# Patient Record
Sex: FEMALE | Race: WHITE | URBAN - METROPOLITAN AREA
[De-identification: names, ages, dates, MRNs, and addresses within clinical notes are randomized per-mention and may not be internally consistent; named-entity substitution may affect disease eponyms.]

---

## 2018-04-17 ENCOUNTER — OFFICE VISIT (OUTPATIENT)
Dept: OBGYN | Facility: CLINIC | Age: 38
End: 2018-04-17
Payer: COMMERCIAL

## 2018-04-17 VITALS
DIASTOLIC BLOOD PRESSURE: 58 MMHG | HEIGHT: 64 IN | SYSTOLIC BLOOD PRESSURE: 100 MMHG | WEIGHT: 127 LBS | HEART RATE: 60 BPM | BODY MASS INDEX: 21.68 KG/M2

## 2018-04-17 DIAGNOSIS — Z12.4 CERVICAL CANCER SCREENING: ICD-10-CM

## 2018-04-17 DIAGNOSIS — Z01.419 ENCOUNTER FOR GYNECOLOGICAL EXAMINATION WITHOUT ABNORMAL FINDING: Primary | ICD-10-CM

## 2018-04-17 PROCEDURE — 87624 HPV HI-RISK TYP POOLED RSLT: CPT | Performed by: OBSTETRICS & GYNECOLOGY

## 2018-04-17 PROCEDURE — 99395 PREV VISIT EST AGE 18-39: CPT | Performed by: OBSTETRICS & GYNECOLOGY

## 2018-04-17 PROCEDURE — G0145 SCR C/V CYTO,THINLAYER,RESCR: HCPCS | Performed by: OBSTETRICS & GYNECOLOGY

## 2018-04-17 ASSESSMENT — ANXIETY QUESTIONNAIRES
3. WORRYING TOO MUCH ABOUT DIFFERENT THINGS: NOT AT ALL
1. FEELING NERVOUS, ANXIOUS, OR ON EDGE: NOT AT ALL
2. NOT BEING ABLE TO STOP OR CONTROL WORRYING: NOT AT ALL
IF YOU CHECKED OFF ANY PROBLEMS ON THIS QUESTIONNAIRE, HOW DIFFICULT HAVE THESE PROBLEMS MADE IT FOR YOU TO DO YOUR WORK, TAKE CARE OF THINGS AT HOME, OR GET ALONG WITH OTHER PEOPLE: NOT DIFFICULT AT ALL
6. BECOMING EASILY ANNOYED OR IRRITABLE: NOT AT ALL
7. FEELING AFRAID AS IF SOMETHING AWFUL MIGHT HAPPEN: NOT AT ALL
5. BEING SO RESTLESS THAT IT IS HARD TO SIT STILL: NOT AT ALL
GAD7 TOTAL SCORE: 0

## 2018-04-17 ASSESSMENT — PATIENT HEALTH QUESTIONNAIRE - PHQ9: 5. POOR APPETITE OR OVEREATING: NOT AT ALL

## 2018-04-17 NOTE — LETTER
April 24, 2018    Iman Caal  [address]  [facility] [address]    Dear Iman,  We are happy to inform you that your PAP smear result from 4/17/18 is normal.  We are now able to do a follow up test on PAP smears. The DNA test is for HPV (Human Papilloma Virus). Cervical cancer is closely linked with certain types of HPV. Your results showed no evidence of high risk HPV.  Therefore we recommend you return in 5 years for your next pap smear and HPV test.  You will still need to return to the clinic every year for an annual exam and other preventive tests.  Please contact the clinic at [phone] with any questions.  Sincerely,    Aissatou Thompson Masters, DO/rlm

## 2018-04-17 NOTE — PROGRESS NOTES
Iman is a 37 year old  female who presents for annual exam.     Besides routine health maintenance,  she would like to discuss trying to get pregnant, stopped OCP's 6 months, using natural family planning now.    HPI:  The patient does not have PCP .      Came off OCPs about 6mo ago. Is wanting to conceive.  Periods were irregular initially, but now are regular each month, q 28-30d last 5d. Had been on OCPs since age 18. Had started for contraception.   No vitamins yet.     Runs marathon in 18d in [place]. Is a runner, not typically long distances.    GYNECOLOGIC HISTORY:    Patient's last menstrual period was 2018.  Her current contraception method is: natural family planning.  She  reports that she has never smoked. She has never used smokeless tobacco.    Patient is sexually active.  STD testing offered?  Declined  Last PHQ-9 score on record =   PHQ-9 SCORE 2018   Total Score 1     Last GAD7 score on record =   PATTIE-7 SCORE 2018   Total Score 0     Alcohol Score = 3    HEALTH MAINTENANCE:  Cholesterol: (No results found for: CHOL   Last Mammo: NA, Result: not applicable, Next Mammo: 3 years   Pap: 2013 WNL HPV-  Colonoscopy:  NA, Result: not applicable, Next Colonoscopy: due at 50 years.  Dexa:  NA    Health maintenance updated:  yes    HISTORY:  Obstetric History       T0      L0     SAB0   TAB0   Ectopic0   Multiple0   Live Births0           Patient Active Problem List   Diagnosis     Sprain of neck     Past Surgical History:   Procedure Laterality Date     NO HISTORY OF SURGERY        Social History   Substance Use Topics     Smoking status: Never Smoker     Smokeless tobacco: Never Used     Alcohol use 0.0 oz/week     0 Standard drinks or equivalent per week      Comment: occasional very rare      Problem (# of Occurrences) Relation (Name,Age of Onset)    Family History Negative (1) Unspecified            Current Outpatient Prescriptions   Medication Sig      "Ascorbic Acid (VITAMIN C PO)      Calcium Citrate-Vitamin D (CALCIUM + D PO)      No current facility-administered medications for this visit.      No Known Allergies    Past medical, surgical, social and family histories were reviewed and updated in EPIC.    ROS:   12 point review of systems negative other than symptoms noted below.    EXAM:  /58  Pulse 60  Ht 5' 4\" (1.626 m)  Wt 127 lb (57.6 kg)  LMP 03/31/2018  Breastfeeding? No  BMI 21.8 kg/m2   BMI: Body mass index is 21.8 kg/(m^2).    PHYSICAL EXAM:  Constitutional:  Appearance: Well nourished, well developed, alert, in no acute distress  Neck:  Lymph Nodes:  No lymphadenopathy present    Thyroid:  Gland size normal, nontender, no nodules or masses present  on palpation  Chest:  Respiratory Effort:  Breathing unlabored  Cardiovascular:    Heart: Auscultation:  Regular rate, normal rhythm, no murmurs present  Breasts: Inspection of Breasts:  No lymphadenopathy present., Palpation of Breasts and Axillae:  No masses present on palpation, no breast tenderness., Axillary Lymph Nodes:  No lymphadenopathy present. and No nodularity, asymmetry or nipple discharge bilaterally.  Gastrointestinal:   Abdominal Examination:  Abdomen nontender to palpation, tone normal without rigidity or guarding, no masses present, umbilicus without lesions   Liver and Spleen:  No hepatomegaly present, liver nontender to palpation    Hernias:  No hernias present  Lymphatic: Lymph Nodes:  No other lymphadenopathy present  Skin:  General Inspection:  No rashes present, no lesions present, no areas of  discoloration    Genitalia and Groin:  No rashes present, no lesions present, no areas of  discoloration, no masses present  Neurologic/Psychiatric:    Mental Status:  Oriented X3     Pelvic Exam:  External Genitalia:     Normal appearance for age, no discharge present, no tenderness present, no inflammatory lesions present, color normal  Vagina:     Normal vaginal vault without " central or paravaginal defects, no discharge present, no inflammatory lesions present, no masses present  Bladder:     Nontender to palpation  Urethra:   Urethral Body:  Urethra palpation normal, urethra structural support normal   Urethral Meatus:  No erythema or lesions present  Cervix:     Appearance healthy, no lesions present, nontender to palpation, no bleeding present  Uterus:     Uterus: firm, normal sized and nontender, anteverted in position.   Adnexa:     No adnexal tenderness present, no adnexal masses present  Perineum:     Perineum within normal limits, no evidence of trauma, no rashes or skin lesions present  Anus:     Anus within normal limits, no hemorrhoids present  Inguinal Lymph Nodes:     No lymphadenopathy present  Pubic Hair:     Normal pubic hair distribution for age  Genitalia and Groin:     No rashes present, no lesions present, no areas of discoloration, no masses present      COUNSELING:   Reviewed preventive health counseling, as reflected in patient instructions       Folic Acid Counseling    BMI: Body mass index is 21.8 kg/(m^2).      ASSESSMENT:  37 year old female with satisfactory annual exam.    ICD-10-CM    1. Encounter for gynecological examination without abnormal finding Z01.419 Pap imaged thin layer screen with HPV - recommended age 30 - 65     HPV High Risk Types DNA Cervical       PLAN:  -Pap/hpv obtained for cervical cancer screening. Reviewed guidelines-pap q 3yrs until age 30 when co-testing q 5 years.  -Breast self awareness discussed. Age 40 for mammogram.  -Osteoporosis prevention discussed.  -Family planning reviewed. Start PNV. Discussed decreasing distance running/intensity. Reviewed age related fertility decline. Return in 6mo if no conception.  -Return one year for next annual exam      Aissatou Pereas, DO

## 2018-04-17 NOTE — MR AVS SNAPSHOT
"              After Visit Summary   2018    Iman Caal    MRN: [id number]           Patient Information     Date Of Birth          1980        Visit Information        Provider Department      2018 1:40 PM Aissatou Soria,  [facility]        Today's Diagnoses     Encounter for gynecological examination without abnormal finding    -  1    Cervical cancer screening           Follow-ups after your visit        Follow-up notes from your care team     Return in about 1 year (around 2019).      Who to contact     If you have questions or need follow up information about today's clinic visit or your schedule please contact [facility] directly at [phone].  Normal or non-critical lab and imaging results will be communicated to you by MyChart, letter or phone within 4 business days after the clinic has received the results. If you do not hear from us within 7 days, please contact the clinic through MyChart or phone. If you have a critical or abnormal lab result, we will notify you by phone as soon as possible.  Submit refill requests through Jelly HQ or call your pharmacy and they will forward the refill request to us. Please allow 3 business days for your refill to be completed.          Additional Information About Your Visit        MyChart Information     Jelly HQ lets you send messages to your doctor, view your test results, renew your prescriptions, schedule appointments and more. To sign up, go to [email or web address] . Click on \"Log in\" on the left side of the screen, which will take you to the Welcome page. Then click on \"Sign up Now\" on the right side of the page.     You will be asked to enter the access code listed below, as well as some personal information. Please follow the directions to create your username and password.     Your access code is: [email or web address]  Expires: 2018  2:36 PM     Your access code will  in 90 " "days. If you need help or a new code, please call your [place] clinic or [phone].        Care EveryWhere ID     This is your Care EveryWhere ID. This could be used by other organizations to access your [place] medical records  [id number]        Your Vitals Were     Pulse Height Last Period Breastfeeding? BMI (Body Mass Index)       60 5' 4\" (1.626 m) 03/31/2018 No 21.8 kg/m2        Blood Pressure from Last 3 Encounters:   04/17/18 100/58   10/11/16 122/72   10/05/15 90/60    Weight from Last 3 Encounters:   04/17/18 127 lb (57.6 kg)   10/11/16 144 lb (65.3 kg)   10/05/15 135 lb (61.2 kg)              We Performed the Following     HPV High Risk Types DNA Cervical     Pap imaged thin layer screen with HPV - recommended age 30 - 65        Primary Care Provider    None Specified       No primary provider on file.        Equal Access to Services     ROOPA LOUISE : Hadcookie Jenkins, ila frank, yu oliva, tessie meyers . So [facility] [phone].    ATENCIÓN: Si habla español, tiene a morales disposición servicios gratuitos de asistencia lingüística. Llame al [phone].    We comply with applicable federal civil rights laws and [place] laws. We do not discriminate on the basis of race, color, national origin, age, disability, sex, sexual orientation, or gender identity.            Thank you!     Thank you for choosing [facility]  for your care. Our goal is always to provide you with excellent care. Hearing back from our patients is one way we can continue to improve our services. Please take a few minutes to complete the written survey that you may receive in the mail after your visit with us. Thank you!             Your Updated Medication List - Protect others around you: Learn how to safely use, store and throw away your medicines at www.disposemymeds.org.          This list is accurate as of 4/17/18  2:36 PM.  Always use your most " recent med list.                   Brand Name Dispense Instructions for use Diagnosis    CALCIUM + D PO           VITAMIN C PO       Encounter for gynecological examination without abnormal finding, Cervical cancer screening

## 2018-04-18 ASSESSMENT — PATIENT HEALTH QUESTIONNAIRE - PHQ9: SUM OF ALL RESPONSES TO PHQ QUESTIONS 1-9: 1

## 2018-04-18 ASSESSMENT — ANXIETY QUESTIONNAIRES: GAD7 TOTAL SCORE: 0

## 2018-04-19 LAB
COPATH REPORT: NORMAL
PAP: NORMAL

## 2018-04-23 LAB
FINAL DIAGNOSIS: NORMAL
HPV HR 12 DNA CVX QL NAA+PROBE: NEGATIVE
HPV16 DNA SPEC QL NAA+PROBE: NEGATIVE
HPV18 DNA SPEC QL NAA+PROBE: NEGATIVE
SPECIMEN DESCRIPTION: NORMAL
SPECIMEN SOURCE CVX/VAG CYTO: NORMAL

## 2018-12-03 ENCOUNTER — OFFICE VISIT (OUTPATIENT)
Dept: OBGYN | Facility: CLINIC | Age: 38
End: 2018-12-03
Payer: COMMERCIAL

## 2018-12-03 ENCOUNTER — TELEPHONE (OUTPATIENT)
Dept: OBGYN | Facility: CLINIC | Age: 38
End: 2018-12-03

## 2018-12-03 VITALS — SYSTOLIC BLOOD PRESSURE: 110 MMHG | WEIGHT: 129 LBS | BODY MASS INDEX: 22.14 KG/M2 | DIASTOLIC BLOOD PRESSURE: 68 MMHG

## 2018-12-03 DIAGNOSIS — Z30.09 FAMILY PLANNING: Primary | ICD-10-CM

## 2018-12-03 DIAGNOSIS — Z31.41 FERTILITY TESTING: ICD-10-CM

## 2018-12-03 PROCEDURE — 99213 OFFICE O/P EST LOW 20 MIN: CPT | Performed by: OBSTETRICS & GYNECOLOGY

## 2018-12-03 RX ORDER — PRENATAL VIT/IRON FUM/FOLIC AC 27MG-0.8MG
1 TABLET ORAL DAILY
COMMUNITY
End: 2024-07-24

## 2018-12-03 NOTE — TELEPHONE ENCOUNTER
Per Dr Pereas pt needs HSG tomorrow    Spoke w/Rachna and scheduled as follows    12/4/2018 12:30 check in 12:10pm to clinic    EPIC updated by Iman MOFFETT updated    Polly Easley  Surgery Scheduler

## 2018-12-03 NOTE — MR AVS SNAPSHOT
After Visit Summary   12/3/2018    Iman Caal    MRN: [id number]           Patient Information     Date Of Birth          1980        Visit Information        Provider Department      12/3/2018 1:40 PM Aissatou Soria DO [facility]        Today's Diagnoses     Family planning    -  1    Fertility testing           Follow-ups after your visit        Follow-up notes from your care team     Return in about 1 day (around 12/4/2018).      Your next 10 appointments already scheduled     Dec 04, 2018 12:30 PM CST   PROCEDURE with Aissatou Soria DO   [facility] ([facility])    [address]  [facility] [address] [phone]              Future tests that were ordered for you today     Open Future Orders        Priority Expected Expires Ordered    Follicle stimulating hormone Routine  3/7/2019 12/3/2018    Estradiol Routine  3/7/2019 12/3/2018    TSH with free T4 reflex Routine  3/7/2019 12/3/2018    Prolactin Routine  3/7/2019 12/3/2018    Anti-Mullerian hormone Routine  12/3/2019 12/3/2018            Who to contact     If you have questions or need follow up information about today's clinic visit or your schedule please contact [facility] directly at [phone].  Normal or non-critical lab and imaging results will be communicated to you by MyChart, letter or phone within 4 business days after the clinic has received the results. If you do not hear from us within 7 days, please contact the clinic through MyChart or phone. If you have a critical or abnormal lab result, we will notify you by phone as soon as possible.  Submit refill requests through [employer] Path or call your pharmacy and they will forward the refill request to us. Please allow 3 business days for your refill to be completed.          Additional Information About Your Visit        MyChart Information     [employer]  "lets you send messages to your doctor, view your test results, renew your prescriptions, schedule appointments and more. To sign up, go to [email or web address] . Click on \"Log in\" on the left side of the screen, which will take you to the Welcome page. Then click on \"Sign up Now\" on the right side of the page.     You will be asked to enter the access code listed below, as well as some personal information. Please follow the directions to create your username and password.     Your access code is: [email or web address]  Expires: 3/3/2019  1:33 PM     Your access code will  in 90 days. If you need help or a new code, please call your [place] clinic or [phone].        Care EveryWhere ID     This is your Care EveryWhere ID. This could be used by other organizations to access your [place] medical records  [id number]        Your Vitals Were     Last Period Breastfeeding? BMI (Body Mass Index)             2018 No 22.14 kg/m2          Blood Pressure from Last 3 Encounters:   18 110/68   18 100/58   10/11/16 122/72    Weight from Last 3 Encounters:   18 129 lb (58.5 kg)   18 127 lb (57.6 kg)   10/11/16 144 lb (65.3 kg)               Primary Care Provider Office Phone # Fax #    [facility] [phone]       [address] [place] [address], [address]  [facility] [address]        Equal Access to Services     [facility] campus[facility]KE : Hadii aad ku hadasho Soriazali, waaxda luqadaha, qaybta kaalmada adefabiola, tessie ramirez. So [facility] [phone].    ATENCIÓN: Si habla español, tiene a morales disposición servicios gratuitos de asistencia lingüística. Llame al [phone].    We comply with applicable federal civil rights laws and [place] laws. We do not discriminate on the basis of race, color, national origin, age, disability, sex, sexual orientation, or gender identity.            Thank you!     Thank you for choosing [facility] " [facility]  for your care. Our goal is always to provide you with excellent care. Hearing back from our patients is one way we can continue to improve our services. Please take a few minutes to complete the written survey that you may receive in the mail after your visit with us. Thank you!             Your Updated Medication List - Protect others around you: Learn how to safely use, store and throw away your medicines at www.disposemymeds.org.          This list is accurate as of 12/3/18  2:45 PM.  Always use your most recent med list.                   Brand Name Dispense Instructions for use Diagnosis    CALCIUM + D PO           prenatal multivitamin w/iron 27-0.8 MG tablet      Take 1 tablet by mouth daily        VITAMIN C PO       Encounter for gynecological examination without abnormal finding, Cervical cancer screening

## 2018-12-03 NOTE — PROGRESS NOTES
SUBJECTIVE:                                                   Iman Caal is a 38 year old female who presents to clinic today for the following health issue(s):  Patient presents with:  Fertility        HPI:  Has been tracking periods with clue neal and doing OPKs. Thinks ovulation around day 14-15. Will have 4-5d of smiling face and then 2d of blinking. Doing timed intercourse. Periods still regular q mo.     No hx pelvic infections.    , age 43, has never had any genital surgery/rads/chemo/chemical exposure. No prior children. Nonsmoker.     Review of PMH, SocHx, SurHx, FHx, medications completed. Epic updated.        LMP 18, day 8  Patient is sexually active, .  Using none for contraception.    reports that she has never smoked. She has never used smokeless tobacco.    STD testing offered?  Declined    Health maintenance updated:  yes    Today's PHQ-2 Score:   PHQ-2 (  [employer]) 10/5/2015   Q1: Little interest or pleasure in doing things 0   Q2: Feeling down, depressed or hopeless 0   PHQ-2 Score 0     Today's PHQ-9 Score:   PHQ-9 SCORE 2018   PHQ-9 Total Score 1     Today's PATTIE-7 Score:   PATTIE-7 SCORE 2018   Total Score 0       Problem list and histories reviewed & adjusted, as indicated.  Additional history: as documented.    Patient Active Problem List   Diagnosis     Sprain of neck     Past Surgical History:   Procedure Laterality Date     melanoma removal  2017      Social History   Substance Use Topics     Smoking status: Never Smoker     Smokeless tobacco: Never Used     Alcohol use 0.0 oz/week     0 Standard drinks or equivalent per week      Comment: occasional very rare      Problem (# of Occurrences) Relation (Name,Age of Onset)    Family History Negative (1) Unspecified            Current Outpatient Prescriptions   Medication Sig     Ascorbic Acid (VITAMIN C PO)      Calcium Citrate-Vitamin D (CALCIUM + D PO)      Prenatal Vit-Fe Fumarate-FA (PRENATAL  MULTIVITAMIN W/IRON) 27-0.8 MG tablet Take 1 tablet by mouth daily     No current facility-administered medications for this visit.      No Known Allergies    ROS:  12 point review of systems negative other than symptoms noted below.    OBJECTIVE:     /68  Wt 129 lb (58.5 kg)  LMP 11/26/2018  Breastfeeding? No  BMI 22.14 kg/m2  Body mass index is 22.14 kg/(m^2).    Exam:  Constitutional:  Appearance: Well nourished, well developed alert, in no acute distress  Chest:  Respiratory Effort:  Breathing unlabored  Neurologic/Psychiatric:  Mental Status:  Oriented X3        ASSESSMENT/PLAN:                                                        ICD-10-CM    1. Family planning Z30.09    2. Fertility testing Z31.41        -Reviewed fertility rates, age related fertility decline and testing available as she is 38 and TTC x 6mo. Ovulating regularly. Will check labs, HSG and SA. Hand out given. Pt to return day 3 for labs. Discussed how labs and evaluations would be used to guide therapy. Discussed when move to [place] consult, but is also available to go at any time.  HSG hopefully this week, is day 8. Brought to lab to get SA testing information.  Questions answered.     Aissatou Thompson Masters, DO  [facility] [place]

## 2018-12-04 ENCOUNTER — TRANSFERRED RECORDS (OUTPATIENT)
Dept: HEALTH INFORMATION MANAGEMENT | Facility: CLINIC | Age: 38
End: 2018-12-04

## 2018-12-04 ENCOUNTER — OFFICE VISIT (OUTPATIENT)
Dept: OBGYN | Facility: CLINIC | Age: 38
End: 2018-12-04
Payer: COMMERCIAL

## 2018-12-04 DIAGNOSIS — N97.1 TUBAL OCCLUSION: ICD-10-CM

## 2018-12-04 DIAGNOSIS — Z87.42 H/O INFERTILITY: Primary | ICD-10-CM

## 2018-12-04 DIAGNOSIS — Z98.890 HISTORY OF HYSTEROSALPINGOGRAM: ICD-10-CM

## 2018-12-04 PROCEDURE — 58340 CATHETER FOR HYSTEROGRAPHY: CPT | Performed by: OBSTETRICS & GYNECOLOGY

## 2018-12-04 NOTE — MR AVS SNAPSHOT
"              After Visit Summary   2018    Iman Caal    MRN: [id number]           Patient Information     Date Of Birth          1980        Visit Information        Provider Department      2018 12:30 PM Aissatou Soria,  [facility]        Today's Diagnoses     H/O infertility    -  1    History of hysterosalpingogram        Tubal occlusion           Follow-ups after your visit        Who to contact     If you have questions or need follow up information about today's clinic visit or your schedule please contact [facility] directly at [phone].  Normal or non-critical lab and imaging results will be communicated to you by Magma Flooringhart, letter or phone within 4 business days after the clinic has received the results. If you do not hear from us within 7 days, please contact the clinic through BRIKAt or phone. If you have a critical or abnormal lab result, we will notify you by phone as soon as possible.  Submit refill requests through idio or call your pharmacy and they will forward the refill request to us. Please allow 3 business days for your refill to be completed.          Additional Information About Your Visit        MyChart Information     idio lets you send messages to your doctor, view your test results, renew your prescriptions, schedule appointments and more. To sign up, go to [email or web address] . Click on \"Log in\" on the left side of the screen, which will take you to the Welcome page. Then click on \"Sign up Now\" on the right side of the page.     You will be asked to enter the access code listed below, as well as some personal information. Please follow the directions to create your username and password.     Your access code is: [email or web address]  Expires: 3/3/2019  1:33 PM     Your access code will  in 90 days. If you need help or a new code, please call your [place] clinic or [phone].        Care EveryWhere " ID     This is your Care EveryWhere ID. This could be used by other organizations to access your [place] medical records  [id number]        Your Vitals Were     Last Period                   11/26/2018            Blood Pressure from Last 3 Encounters:   12/03/18 110/68   04/17/18 100/58   10/11/16 122/72    Weight from Last 3 Encounters:   12/03/18 129 lb (58.5 kg)   04/17/18 127 lb (57.6 kg)   10/11/16 144 lb (65.3 kg)              We Performed the Following     CATH/INJECT HYSTEROSALPINGOGRAM        Primary Care Provider Office Phone # Fax #    [facility] [phone]       [address] [place] [address], [address]  [facility] [address]        Equal Access to Services     ROOPA LOUISE : Jyoti hein Soalvaro, waaxda luqadaha, qaybta kaalmada adeegyada, tessie meyers . So [facility] [phone].    ATENCIÓN: Si habla español, tiene a morales disposición servicios gratuitos de asistencia lingüística. Llame al [phone].    We comply with applicable federal civil rights laws and [place] laws. We do not discriminate on the basis of race, color, national origin, age, disability, sex, sexual orientation, or gender identity.            Thank you!     Thank you for choosing [facility]  for your care. Our goal is always to provide you with excellent care. Hearing back from our patients is one way we can continue to improve our services. Please take a few minutes to complete the written survey that you may receive in the mail after your visit with us. Thank you!             Your Updated Medication List - Protect others around you: Learn how to safely use, store and throw away your medicines at www.disposemymeds.org.          This list is accurate as of 12/4/18 11:59 PM.  Always use your most recent med list.                   Brand Name Dispense Instructions for use Diagnosis    CALCIUM + D PO           prenatal multivitamin w/iron 27-0.8 MG  tablet      Take 1 tablet by mouth daily        VITAMIN C PO       Encounter for gynecological examination without abnormal finding, Cervical cancer screening

## 2018-12-05 PROBLEM — N97.1 TUBAL OCCLUSION: Status: ACTIVE | Noted: 2018-12-05

## 2018-12-06 NOTE — PROGRESS NOTES
HSG-  INDICATIONS:                                                      Is a pregnancy test required: No.  Was a consent obtained?  Yes    Iman  is here for HSG.     Today's PHQ-2 Score:   PHQ-2 ( 1999 [employer]) 10/5/2015   Q1: Little interest or pleasure in doing things 0   Q2: Feeling down, depressed or hopeless 0   PHQ-2 Score 0       PROCEDURE:                                                      Patient was placed in supine position on X-ray table. Cervix was swabbed with Betadine.  Tenaculum placed on cervix and HSG balloon catheter was applied. She was positioned under the x-ray by the radiologist and contrast was instilled.    Appropriate images were obtained after contrast was instilled.    HSG is being done for infertility.    Findings:  normal cavity, fill and spill of left tube and right tube blocked    The findings were discussed with the patient. She experienced moderate discomfort during the procedure. There were no complications. Patient was discharged in stable condition.    Patient counseled she may have increased cramping and spotting later today.  Plan to be determined following radiologist's final read.     Will plan to complete SA and day 3 labs and then meet to determine next steps in plan. Questions answered.    Aissatou Thompson Masters, DO

## 2018-12-19 ENCOUNTER — MEDICAL CORRESPONDENCE (OUTPATIENT)
Dept: HEALTH INFORMATION MANAGEMENT | Facility: CLINIC | Age: 38
End: 2018-12-19

## 2018-12-26 ENCOUNTER — TELEPHONE (OUTPATIENT)
Dept: OBGYN | Facility: CLINIC | Age: 38
End: 2018-12-26

## 2018-12-26 ENCOUNTER — DOCUMENTATION ONLY (OUTPATIENT)
Dept: NEUROLOGY | Facility: CLINIC | Age: 38
End: 2018-12-26

## 2018-12-26 NOTE — TELEPHONE ENCOUNTER
Pt calling  Attempting to conceive for last 8 months    did have SA completed as recommended by JIMMY Soria last month with [facility]  Recommended labs on day 3 of cycle  Pt experienced a heavier than normal period Saturday 12/22/18 and tapered off to spotting so today is day 5/ 6 according to pt.  She did take 2 separate UPT's after the bleeding and they were negative as unsure was experiencing a miscarriage.  Could have just been an irregular bleeding episode    Pt was unsure if she missed her window now for day 3 labs. Informed pt to continue to document all OPK's, menses, bleeding flow, UPT's in diary and may have to wait until next cycle for day 3 labs as previously recommended.  Pt verbalized understanding and no further questions.    Routing to Dr Soria to advise if above information appropriate to wait for next cycle for day 3 labs.  Will call pt if Dr Soria gives different recommendations on labs.    Last OV 12/4/18:  Will plan to complete SA and day 3 labs and then meet to determine next steps in plan. Questions answered.   Aissatou Soria, DO

## 2018-12-28 NOTE — TELEPHONE ENCOUNTER
To have day 3 labs done next month. Are in computer system, so can go to any  lab or hospital lab, just needs to make an appt.     Aissatou Thompson Masters, DO

## 2019-01-14 ENCOUNTER — TELEPHONE (OUTPATIENT)
Dept: OBGYN | Facility: CLINIC | Age: 39
End: 2019-01-14

## 2019-01-14 NOTE — TELEPHONE ENCOUNTER
Keagan's SA received.  Is good overall we can discuss the fine details at a follow up visit but nothing more for him to do at this point.   Will await her labs.   I would recommend she make an appointment to come in at least a week after labs done to talk about next steps.    Aissatou Thompson Masters, DO

## 2019-01-16 NOTE — TELEPHONE ENCOUNTER
FUTURE VISIT INFORMATION      FUTURE VISIT INFORMATION:    Date: 2/6/19    Time: 12.30p    [place] [facility] [place]  REFERRAL INFORMATION:    Referring provider:  Dr. Valentín Rivera      Referring providers clinic:  [facility]    Reason for visit/diagnosis  lower extremity numbness     RECORDS REQUESTED FROM:       Clinic name Comments Records Status Imaging Status   [facility] Dr. Rivera 12/19/18 OV Care Everywhere                                      1/16/19: External referral from Dr. Rivera at [facility]. Records are in Care Everywhere. Sent request for imaging

## 2019-01-17 ENCOUNTER — TELEPHONE (OUTPATIENT)
Dept: OBGYN | Facility: CLINIC | Age: 39
End: 2019-01-17

## 2019-01-17 NOTE — TELEPHONE ENCOUNTER
Pt had one day of heavy bleeding on 1/16/19. She is now having spotting. Unsure if she should come for day 3 testing tomorrow. Cycles have never been so irregular and short. Pt advised with heavy bleeding yesterday can come and have day 3 labs done tomorrow. Transferred to scheduling to make appt.

## 2019-01-18 DIAGNOSIS — Z31.41 FERTILITY TESTING: ICD-10-CM

## 2019-01-18 LAB
ESTRADIOL SERPL-MCNC: 30 PG/ML
FSH SERPL-ACNC: 6.4 IU/L
PROLACTIN SERPL-MCNC: 6 UG/L (ref 3–27)

## 2019-01-18 PROCEDURE — 82670 ASSAY OF TOTAL ESTRADIOL: CPT | Performed by: OBSTETRICS & GYNECOLOGY

## 2019-01-18 PROCEDURE — 83001 ASSAY OF GONADOTROPIN (FSH): CPT | Performed by: OBSTETRICS & GYNECOLOGY

## 2019-01-18 PROCEDURE — 36415 COLL VENOUS BLD VENIPUNCTURE: CPT | Performed by: OBSTETRICS & GYNECOLOGY

## 2019-01-18 PROCEDURE — 84146 ASSAY OF PROLACTIN: CPT | Performed by: OBSTETRICS & GYNECOLOGY

## 2019-01-18 PROCEDURE — 84443 ASSAY THYROID STIM HORMONE: CPT | Performed by: OBSTETRICS & GYNECOLOGY

## 2019-01-18 PROCEDURE — 83520 IMMUNOASSAY QUANT NOS NONAB: CPT | Mod: 90 | Performed by: OBSTETRICS & GYNECOLOGY

## 2019-01-18 PROCEDURE — 99000 SPECIMEN HANDLING OFFICE-LAB: CPT | Performed by: OBSTETRICS & GYNECOLOGY

## 2019-01-19 LAB — TSH SERPL DL<=0.005 MIU/L-ACNC: 1.26 MU/L (ref 0.4–4)

## 2019-01-21 LAB — MIS SERPL-MCNC: 2.33 NG/ML (ref 0.18–11.71)

## 2019-01-23 DIAGNOSIS — N83.9 FALLOPIAN TUBE DISORDER: Primary | ICD-10-CM

## 2019-01-24 ENCOUNTER — OFFICE VISIT (OUTPATIENT)
Dept: NEUROLOGY | Facility: CLINIC | Age: 39
End: 2019-01-24
Payer: COMMERCIAL

## 2019-01-24 ENCOUNTER — TELEPHONE (OUTPATIENT)
Dept: NEUROLOGY | Facility: CLINIC | Age: 39
End: 2019-01-24

## 2019-01-24 DIAGNOSIS — R20.9 DISTURBANCE OF SKIN SENSATION: Primary | ICD-10-CM

## 2019-01-24 NOTE — PROGRESS NOTES
[facility]  Electrodiagnostic Laboratory    Nerve Conduction & EMG Report          Patient:       Iman Caal  Patient ID:    [id number]  Gender:        Female  YOB: 1980  Age:           38 Years 7 Months        History & Examination: This is a 38-year-old female referred by Dr. Rivera for evaluation of right thigh numbness.  The possibility of a right L4 radiculopathy is being evaluated.    Techniques: Motor conduction studies were done with surface recording electrodes. Sensory conduction studies were performed with surface electrodes, unless indicated otherwise by (n), designating the use of subdermal recording electrodes. Temperature was monitored and recorded throughout the study. Upper extremities were maintained at a temperature of 32 degrees Centigrade or higher.  Lower extremities were maintained at a temperature of 31degrees Centigrade or higher. EMG was done with a concentric needle electrode.        Results: The right sural sensory nerve action potential is normal.  The right superficial peroneal sensory nerve action potential is normal.  Motor conduction studies of the right peroneal and tibial nerves were normal.  Needle exam of the right leg including the right vastus lateralis, right adductor longus and right iliopsoas was normal.  There was one area of fibrillation in the right L4 paraspinal muscles, however motor unit potentials in this area appeared normal.    Interpretation: The EMG is essentially normal.  The mild fibrillation in the right L4 paraspinals could indicate an early or mild lumbar radiculopathy or could be seen with facet disease affecting the dorsal L4 ramus.  Clinical correlation is necessary.    EMG Physician: Dwayne Mcleod MD         Sensory NCS      Nerve / Sites Rec. Site Onset Peak NP Amp Ref. PP Amp Dist Redd Ref. Temp     ms ms  V  V  V cm m/s m/s  C   R SURAL - Lat Mall      Calf Ankle 3.18 3.96 18.6 8.0 21.0 14 44.1 38.0 29.3   R SUP  PERONEAL      Lat Leg Patel 2.81 3.70 12.3  17.1 12.5 44.4 38.0 28.9       Motor NCS      Nerve / Sites Rec. Site Lat Ref. Amp Ref. Rel Amp Dist Redd Ref. Dur. Area Temp.     ms ms mV mV % cm m/s m/s ms %  C   R DEEP PERONEAL - EDB      Ankle EDB 4.74 6.00 3.1 2.5 100 8   6.98 100 29      FibHead EDB 12.40  3.5  113 33 43.1 38.0 7.34 111 29      Pop Fos EDB 13.65  3.3  107 8 64.0 38.0 7.60 114 29      4 EDB 5.31  0.9  28.8    5.89 35.1 29   R TIBIAL - AH      Ankle AH 3.80 6.00 8.6 4.0 100 8   7.97 100 29      Pop Fos AH 13.02  5.2  60 40 43.4 38.0 8.07 54.5 29       F  Wave      Nerve Min F Lat Max F Lat Mean FLat Temp.    ms ms ms  C   R TIBIAL 46.56 52.03 48.59 29.2       Needle EMG (W)      EMG Summary Table     Spontaneous MUAP Recruitment    IA Fib/PSW Fasc H.F. Amp Dur. PPP Pattern   R. TIB ANTERIOR N None None None N N N N   R. GASTROCN (MED) N None None None N N N N   R. VAST LATERALIS N None None None N N N N   R. T FASCIA MARIELENA N None None None N N N N   R. ADD LONGUS N None None None N N N N   R. ILIOPSOAS N None None None N N N N   R. L4 PSP Increased +/- None None N N N N

## 2019-01-24 NOTE — LETTER
1/24/2019       RE: Iman Caal  [address]  [facility] [address]     Dear Colleague,    Thank you for referring your patient, Iman Caal, to the [facility] EMG at [facility]. Please see a copy of my visit note below.        [facility]  Electrodiagnostic Laboratory    Nerve Conduction & EMG Report    Patient:       Iman Caal  Patient ID:    [id number]  Gender:        Female  YOB: 1980  Age:           38 Years 7 Months        History & Examination: This is a 38-year-old female referred by Dr. Rivera for evaluation of right thigh numbness.  The possibility of a right L4 radiculopathy is being evaluated.    Techniques: Motor conduction studies were done with surface recording electrodes. Sensory conduction studies were performed with surface electrodes, unless indicated otherwise by (n), designating the use of subdermal recording electrodes. Temperature was monitored and recorded throughout the study. Upper extremities were maintained at a temperature of 32 degrees Centigrade or higher.  Lower extremities were maintained at a temperature of 31degrees Centigrade or higher. EMG was done with a concentric needle electrode.        Results: The right sural sensory nerve action potential is normal.  The right superficial peroneal sensory nerve action potential is normal.  Motor conduction studies of the right peroneal and tibial nerves were normal.  Needle exam of the right leg including the right vastus lateralis, right adductor longus and right iliopsoas was normal.  There was one area of fibrillation in the right L4 paraspinal muscles, however motor unit potentials in this area appeared normal.    Interpretation: The EMG is essentially normal.  The mild fibrillation in the right L4 paraspinals could indicate an early or mild lumbar radiculopathy or could be seen with facet disease affecting the dorsal L4 ramus.  Clinical correlation  is necessary.    EMG Physician: Dwayne Mcleod MD         Sensory NCS      Nerve / Sites Rec. Site Onset Peak NP Amp Ref. PP Amp Dist Redd Ref. Temp     ms ms  V  V  V cm m/s m/s  C   R SURAL - Lat Mall      Calf Ankle 3.18 3.96 18.6 8.0 21.0 14 44.1 38.0 29.3   R SUP PERONEAL      Lat Leg Patel 2.81 3.70 12.3  17.1 12.5 44.4 38.0 28.9       Motor NCS      Nerve / Sites Rec. Site Lat Ref. Amp Ref. Rel Amp Dist Redd Ref. Dur. Area Temp.     ms ms mV mV % cm m/s m/s ms %  C   R DEEP PERONEAL - EDB      Ankle EDB 4.74 6.00 3.1 2.5 100 8   6.98 100 29      FibHead EDB 12.40  3.5  113 33 43.1 38.0 7.34 111 29      Pop Fos EDB 13.65  3.3  107 8 64.0 38.0 7.60 114 29      4 EDB 5.31  0.9  28.8    5.89 35.1 29   R TIBIAL - AH      Ankle AH 3.80 6.00 8.6 4.0 100 8   7.97 100 29      Pop Fos AH 13.02  5.2  60 40 43.4 38.0 8.07 54.5 29       F  Wave      Nerve Min F Lat Max F Lat Mean FLat Temp.    ms ms ms  C   R TIBIAL 46.56 52.03 48.59 29.2       Needle EMG (W)      EMG Summary Table     Spontaneous MUAP Recruitment    IA Fib/PSW Fasc H.F. Amp Dur. PPP Pattern   R. TIB ANTERIOR N None None None N N N N   R. GASTROCN (MED) N None None None N N N N   R. VAST LATERALIS N None None None N N N N   R. T FASCIA MARIELENA N None None None N N N N   R. ADD LONGUS N None None None N N N N   R. ILIOPSOAS N None None None N N N N   R. L4 PSP Increased +/- None None N N N N                 Again, thank you for allowing me to participate in the care of your patient.      Sincerely,    Dwayne Mcleod MD

## 2019-01-25 NOTE — PROGRESS NOTES
SUBJECTIVE:                                                   Iman Caal is a 38 year old female who presents to clinic today for the following health issue(s):  Patient presents with:  Infertililty          HPI:  Started TTC 9mo ago.   Was not able to detect ovulation last month. Had early heavy period last month and period heavy this month. Both were short, however.  Will be leaving town in 2 days for one week to go to [place].         Patient's last menstrual period was 2019..   Patient is sexually active, .  Using none for contraception.    reports that  has never smoked. she has never used smokeless tobacco.    STD testing offered?  Declined    Health maintenance updated:  yes    Today's PHQ-2 Score:   PHQ-2 (  [employer]) 10/5/2015   Q1: Little interest or pleasure in doing things 0   Q2: Feeling down, depressed or hopeless 0   PHQ-2 Score 0     Today's PHQ-9 Score:   PHQ-9 SCORE 2018   PHQ-9 Total Score 1     Today's PATTIE-7 Score:   PATTIE-7 SCORE 2018   Total Score 0       Problem list and histories reviewed & adjusted, as indicated.  Additional history: as documented.    Patient Active Problem List   Diagnosis     Sprain of neck     Tubal occlusion     Past Surgical History:   Procedure Laterality Date     melanoma removal  2017      Social History     Tobacco Use     Smoking status: Never Smoker     Smokeless tobacco: Never Used   Substance Use Topics     Alcohol use: Yes     Alcohol/week: 0.0 oz     Comment: occasional very rare      Problem (# of Occurrences) Relation (Name,Age of Onset)    Family History Negative (1) Other            Current Outpatient Medications   Medication Sig     Ascorbic Acid (VITAMIN C PO)      Calcium Citrate-Vitamin D (CALCIUM + D PO)      Prenatal Vit-Fe Fumarate-FA (PRENATAL MULTIVITAMIN W/IRON) 27-0.8 MG tablet Take 1 tablet by mouth daily     No current facility-administered medications for this visit.      No Known Allergies    ROS:  12 point  "review of systems negative other than symptoms noted below.  Genitourinary: Heavy Bleeding with Period and Irregular Menses  Endocrine: Cold Intolerance    OBJECTIVE:     /58   Pulse 62   Ht 1.626 m (5' 4\")   Wt 58.1 kg (128 lb)   LMP 01/16/2019   BMI 21.97 kg/m    Body mass index is 21.97 kg/m .    Exam:  Constitutional:  Appearance: Well nourished, well developed alert, in no acute distress  Chest:  Respiratory Effort:  Breathing unlabored  Neurologic/Psychiatric:  Mental Status:  Oriented X3        ASSESSMENT/PLAN:                                                        ICD-10-CM    1. Encounter for fertility planning Z31.89 US Pelvis Complete w Transvaginal Follicular Init   2. 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 Labs wnl, SA borderline normal (morphology 3%, normal >3%), HSG showed occluded right tube. Discussed overall age considerations moving forward.  Discussed clomid (5-9)/IUI and success rates which may be expected. Also discussed every other cycle due to tubal occlusion. Reviewed process of IUI.   US performed today, showing follicle developing on left patent side. Reviewed US findings with pt. Will be out of town, so will do OPKs and timed intercourse.   Pt will consider tx options and contact clinic when/if ready to proceed with clomid/IUI. Also discussed she may see [facility] right now if desired. Otherwise, rec 3 cycles clomid/IUI and if not successful, will send to [facility].   Questions answered    Aissatou Thompson Masters, DO  [facility] [place]  "

## 2019-01-28 ENCOUNTER — OFFICE VISIT (OUTPATIENT)
Dept: OBGYN | Facility: CLINIC | Age: 39
End: 2019-01-28
Payer: COMMERCIAL

## 2019-01-28 ENCOUNTER — ANCILLARY PROCEDURE (OUTPATIENT)
Dept: ULTRASOUND IMAGING | Facility: CLINIC | Age: 39
End: 2019-01-28
Payer: COMMERCIAL

## 2019-01-28 VITALS
WEIGHT: 128 LBS | BODY MASS INDEX: 21.85 KG/M2 | SYSTOLIC BLOOD PRESSURE: 100 MMHG | HEIGHT: 64 IN | DIASTOLIC BLOOD PRESSURE: 58 MMHG | HEART RATE: 62 BPM

## 2019-01-28 DIAGNOSIS — N97.1 TUBAL OCCLUSION: ICD-10-CM

## 2019-01-28 DIAGNOSIS — Z31.41 ENCOUNTER FOR FERTILITY TESTING: ICD-10-CM

## 2019-01-28 DIAGNOSIS — Z31.89 ENCOUNTER FOR FERTILITY PLANNING: Primary | ICD-10-CM

## 2019-01-28 PROCEDURE — 99214 OFFICE O/P EST MOD 30 MIN: CPT | Performed by: OBSTETRICS & GYNECOLOGY

## 2019-01-28 PROCEDURE — 76830 TRANSVAGINAL US NON-OB: CPT | Performed by: OBSTETRICS & GYNECOLOGY

## 2019-01-28 ASSESSMENT — MIFFLIN-ST. JEOR: SCORE: 1245.6

## 2019-02-06 ENCOUNTER — PRE VISIT (OUTPATIENT)
Dept: NEUROLOGY | Facility: CLINIC | Age: 39
End: 2019-02-06

## 2019-02-09 ASSESSMENT — ENCOUNTER SYMPTOMS
HOT FLASHES: 0
DECREASED LIBIDO: 0

## 2019-02-12 ENCOUNTER — OFFICE VISIT (OUTPATIENT)
Dept: NEUROLOGY | Facility: CLINIC | Age: 39
End: 2019-02-12
Payer: COMMERCIAL

## 2019-02-12 VITALS — DIASTOLIC BLOOD PRESSURE: 61 MMHG | OXYGEN SATURATION: 98 % | SYSTOLIC BLOOD PRESSURE: 110 MMHG | HEART RATE: 67 BPM

## 2019-02-12 DIAGNOSIS — M79.2 NEURALGIA: Primary | ICD-10-CM

## 2019-02-12 DIAGNOSIS — R20.2 PARESTHESIAS: ICD-10-CM

## 2019-02-12 DIAGNOSIS — M79.2 NEURALGIA: ICD-10-CM

## 2019-02-12 LAB
DEPRECATED CALCIDIOL+CALCIFEROL SERPL-MC: 50 UG/L (ref 20–75)
HBA1C MFR BLD: 5.2 % (ref 0–5.6)
VIT B12 SERPL-MCNC: 694 PG/ML (ref 193–986)

## 2019-02-12 SDOH — HEALTH STABILITY: MENTAL HEALTH: HOW OFTEN DO YOU HAVE A DRINK CONTAINING ALCOHOL?: 2-3 TIMES A WEEK

## 2019-02-12 SDOH — HEALTH STABILITY: MENTAL HEALTH: HOW MANY STANDARD DRINKS CONTAINING ALCOHOL DO YOU HAVE ON A TYPICAL DAY?: 1 OR 2

## 2019-02-12 ASSESSMENT — PAIN SCALES - GENERAL: PAINLEVEL: NO PAIN (0)

## 2019-02-12 NOTE — Clinical Note
Please send note to Dr. Maksim Rivera at [facility] in [facility].  His current contact information listed in epic is incorrect and it would be great if we could get this corrected

## 2019-02-12 NOTE — NURSING NOTE
Chief Complaint   Patient presents with     Consult     Numbness     IN RIGHT LEG WHEN RUNNING       Janes Smith, EMT

## 2019-02-12 NOTE — PATIENT INSTRUCTIONS
Talk to Dr. Rivera about repeating MRI pelvis to make sure nothing is pinching lateral femoral cutaneous nerve. The MRI hip might have been sufficient.    We will check labs looking for causes of nerve injury. Dr Guzman will send results through my chart    Consider enrolling in running program at [facility] to work on technique.

## 2019-02-12 NOTE — LETTER
2/12/2019       RE: Iman Caal  [address]  [facility] [address]     Dear Colleague,    Thank you for referring your patient, Iman Caal, to the [facility] NEUROLOGY at [facility]. Please see a copy of my visit note below.        [facility]    Iman Caal MRN# [id number]   Age: 38 year old YOB: 1980     Requesting physician: Dr Maksim Rivera  Clinic, [facility]     Chief Complaint:  Referred by Dr. Rivera for evaluation of right thigh paresthesias    History of Present Illness:  Iman is a 38-year-old woman who presents for evaluation of right thigh paresthesias that started to bother her in August 2018 while training for a marathon.  Initially the patient only noticed symptoms when she was running.  Typically it was with running longer distances than 4 miles.  If she kept running the discomfort would get more severe.  She describes an area that is in the upper anterior to lateral thigh that would feel like an electrical-like sensation.  She denied weakness or bladder dysfunction during this time but later on during the appointment reports that when she would be running she would almost fall because her leg with spasm.  She clarifies that she does not particularly think this was painful but it did stop her from wanting to run any further.    The patient participates in some cross training weight lifting exercises and this has never triggered the symptoms in her thigh.  More recently she did notice occasional triggering of symptoms outside of running but typically it was with running.    She saw Dr. Rivera at [facility] and he evaluated for a labral tear in the hip with a MRI arthrogram of the hip.  This was performed on November 9, 2018 and showed no evidence of a labral tear.  There was trace tendinosis of the membranous portion of the distal gluteus medius tendon.  There was a narrowing of the ischiofemoral  interval and slight edema in the intervening quadratus femoris muscle which can be associated with symptoms of ischiofemoral impingement.  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  These findings were compared to a prior MRI of the pelvis from July 13, 2016.  At that time the patient had a stress fracture in her sacrum from over training with running.  This stress fracture because significantly different pain and has now healed.    Dr. Rivera referred the patient for an EMG.  This was performed by my colleague, Dr. Mcleod, and was essentially normal.  The nonspecific mild changes seen in the L4 paraspinals would not be considered significant when interpreting along with history noting that the patient has had symptoms for such a long period of time.    The patient has a diagnosis of melanoma that was surgically resected with clear margins.  She is followed closely by dermatology.  She also has a history of celiac disease and takes multiple supplements as well as avoids gluten in her diet.    Past Medical History:   Diagnosis Date     Celiac disease      Decreased libido 2015    never had an orgasim even with masturbation low interst in sex getting worse     Melanoma (H) 2018    On left abd wall       Patient Active Problem List   Diagnosis     Sprain of neck     Tubal occlusion       Past Surgical History:   Procedure Laterality Date     melanoma removal  04/2017       Social History     Socioeconomic History     Marital status:      Spouse name: Not on file     Number of children: Not on file     Years of education: Not on file     Highest education level: Not on file   Social Needs     Financial resource strain: Not on file     Food insecurity - worry: Not on file     Food insecurity - inability: Not on file     Transportation needs - medical: Not on file     Transportation needs - non-medical: Not on file    Occupational History     Occupation: marketing   Tobacco Use     Smoking status: Never Smoker     Smokeless tobacco: Never Used   Substance and Sexual Activity     Alcohol use: Yes     Alcohol/week: 0.0 oz     Frequency: 2-3 times a week     Drinks per session: 1 or 2     Comment: occasional very rare     Drug use: No     Sexual activity: Yes     Partners: Male     Birth control/protection: Natural Family Planning     Comment: 04/30/2015 premenopausal   Other Topics Concern     Parent/sibling w/ CABG, MI or angioplasty before 65F 55M? Not Asked   Social History Narrative     Not on file       Family History   Problem Relation Age of Onset     Family History Negative Other        Current Outpatient Medications   Medication Sig     Ascorbic Acid (VITAMIN C PO) Take 1 tablet by mouth daily      Calcium Citrate-Vitamin D (CALCIUM + D PO) Take 1 tablet by mouth daily      Prenatal Vit-Fe Fumarate-FA (PRENATAL MULTIVITAMIN W/IRON) 27-0.8 MG tablet Take 1 tablet by mouth daily     No current facility-administered medications for this visit.         No Known Allergies    ROS: Please see HPI all other systems review and negative.    Physical Examination:  /61 (BP Location: Right arm, Patient Position: Sitting, Cuff Size: Adult Regular)   Pulse 67   LMP 01/16/2019   SpO2 98%   General Appearance:  The patient is well-groomed and cooperative with examination.  She is in no acute distress  Neurological Examination  Cognition: oriented x3, attention and recall intact. No aphasia or dysarthria  Cranial Nerves: 2-12 intact. Funduscopic examination is normal with sharp disc margins bilaterally.   General Motor Survey: Normal muscle bulk, tone and strength in all four ext. No tremor  Coordination: Finger to nose and heel knee shin normal bilaterally. Normal alternating movements.  Reflexes: Upper and lower extremity reflexes are within normal limits (+2) and bilaterally symmetric.   Sensory Examination:   Vibration:  normal in all four ext   Pinprick:normal in all four ext except for a mild subjective distortion in about a 3 inch radius just below the ilioinguinal     Gait: Normal gait which is stable on turns. Normal arm swing. Romberg negative. Able to run    Cardiovascular Examination:  Heart is regular in rate and rhythm to auscultation. No significant murmurs. No carotid bruits. No significant peripheral edema. Pedal pulses are palpable bilaterally.     Musculoskeletal Examination:  Neck is supple with full range of motion. No tenderness to palpation.    Investigations:  MR hip arthrogram November 9, 2018  IMPRESSION:    1. No discrete labral tear or significant degenerative arthritis involving the  right hip.  2. Trace tendinosis of the membranous portion of the distal gluteus medius  tendon.  3. Narrowing of the ischiofemoral interval and slight edema in the intervening  quadratus femoris muscle, which can be associated with symptoms of ischiofemoral  impingement.  4. 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    Result Impression   IMPRESSION:  Normal lumbar alignment. Disc spaces are maintained. 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   Result Narrative   EXAM:  DX LUMBAR SPINE 2-3 VIEWS   Other Result Information   Interface, [facility] Generic [id number] - 11/06/2018  9:24 AM CST  EXAM:  DX LUMBAR SPINE 2-3 VIEWS      IMPRESSION:  Normal lumbar alignment. Disc spaces are maintained. 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 This  correlates with the radiographic findings. There is a tiny amount of periosteal new bone and surrounding mild soft tissue swelling in the medial proximal adductor.     Tiny lobulated 6 mm lesion within the lower sacrum which is hyperintense on T2-weighted images and contains a peripheral rim of lobulated intermediate T1 signal and bright T1 signal centrally compatible with macroscopic fat. 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 Routine MR surveillance/follow-up would be recommended due to the exceedingly low malignant potential. Initial follow-up could be considered in 6 months to a year.     Mild tendinopathy and sliver of overlying fluid at the left hamstrings origin. Remainder negative.     Findings discussed and reviewed with Dr. Lam.     Electronically signed by:    LEATHA Norris MD [id number] 13-Jul-2016 14:48       Impression/Recommendations:  1. Right thigh paresthesias/spasm  Iman is presenting with pain that was bothering her with extended running.  There is no evidence on examination or on prior testing that she has a lumbosacral radiculopathy nor a femoral nerve injury.  There could be a lateral femoral cutaneous neuropathy component although the patient is describing significant spasm that would potentially make her fall from a treadmill.  Lateral femoral cutaneous neuropathy would not cause such as spasm as it is only a sensory nerve.  I question whether the ischiofemoral impingement syndrome that was potentially seen on MRI scan of the pelvis could result in impingement of the lateral femoral cutaneous nerve.  I would have to defer to Dr. Rivera on this grounds.  If it were if therapy is provided for the impingement syndrome I would assume all symptoms would improve.    If lateral femoral cutaneous neuropathy is present there would be no concern about the danger of her running.  It is interesting that previously she is also had a sacral stress fracture from running and I do  question whether her technique and training approach should be evaluated further.  I believe that Dr. Rivera's office has a running program that could perhaps improve her running technique.    In the setting of a possible paresthesia or nerve injury we will look for underlying causes of nerve injury.  She will go for laboratory testing looking for nutritional deficits, diabetes and monoclonal proteins.  I will write to her with those results through my chart.    Follow-up with me will be as needed.    Autumn Guzman MD FAAN  Department of Neurology  Pager [phone]

## 2019-02-12 NOTE — PROGRESS NOTES
[facility]    Iman Caal MRN# [id number]   Age: 38 year old YOB: 1980     Requesting physician: Dr Maksim Rivera  [facility], [facility]     Chief Complaint:  Referred by Dr. Rivera for evaluation of right thigh paresthesias    History of Present Illness:  Iman is a 38-year-old woman who presents for evaluation of right thigh paresthesias that started to bother her in August 2018 while training for a marathon.  Initially the patient only noticed symptoms when she was running.  Typically it was with running longer distances than 4 miles.  If she kept running the discomfort would get more severe.  She describes an area that is in the upper anterior to lateral thigh that would feel like an electrical-like sensation.  She denied weakness or bladder dysfunction during this time but later on during the appointment reports that when she would be running she would almost fall because her leg with spasm.  She clarifies that she does not particularly think this was painful but it did stop her from wanting to run any further.    The patient participates in some cross training weight lifting exercises and this has never triggered the symptoms in her thigh.  More recently she did notice occasional triggering of symptoms outside of running but typically it was with running.    She saw Dr. Rivera at [facility] and he evaluated for a labral tear in the hip with a MRI arthrogram of the hip.  This was performed on November 9, 2018 and showed no evidence of a labral tear.  There was trace tendinosis of the membranous portion of the distal gluteus medius tendon.  There was a narrowing of the ischiofemoral interval and slight edema in the intervening quadratus femoris muscle which can be associated with symptoms of ischiofemoral impingement.  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  These  findings were compared to a prior MRI of the pelvis from July 13, 2016.  At that time the patient had a stress fracture in her sacrum from over training with running.  This stress fracture because significantly different pain and has now healed.    Dr. Rivera referred the patient for an EMG.  This was performed by my colleague, Dr. Mcleod, and was essentially normal.  The nonspecific mild changes seen in the L4 paraspinals would not be considered significant when interpreting along with history noting that the patient has had symptoms for such a long period of time.    The patient has a diagnosis of melanoma that was surgically resected with clear margins.  She is followed closely by dermatology.  She also has a history of celiac disease and takes multiple supplements as well as avoids gluten in her diet.    Past Medical History:   Diagnosis Date     Celiac disease      Decreased libido 2015    never had an orgasim even with masturbation low interst in sex getting worse     Melanoma (H) 2018    On left abd wall       Patient Active Problem List   Diagnosis     Sprain of neck     Tubal occlusion       Past Surgical History:   Procedure Laterality Date     melanoma removal  04/2017       Social History     Socioeconomic History     Marital status:      Spouse name: Not on file     Number of children: Not on file     Years of education: Not on file     Highest education level: Not on file   Social Needs     Financial resource strain: Not on file     Food insecurity - worry: Not on file     Food insecurity - inability: Not on file     Transportation needs - medical: Not on file     Transportation needs - non-medical: Not on file   Occupational History     Occupation: marketing   Tobacco Use     Smoking status: Never Smoker     Smokeless tobacco: Never Used   Substance and Sexual Activity     Alcohol use: Yes     Alcohol/week: 0.0 oz     Frequency: 2-3 times a week     Drinks per session: 1 or 2     Comment:  occasional very rare     Drug use: No     Sexual activity: Yes     Partners: Male     Birth control/protection: Natural Family Planning     Comment: 04/30/2015 premenopausal   Other Topics Concern     Parent/sibling w/ CABG, MI or angioplasty before 65F 55M? Not Asked   Social History Narrative     Not on file       Family History   Problem Relation Age of Onset     Family History Negative Other        Current Outpatient Medications   Medication Sig     Ascorbic Acid (VITAMIN C PO) Take 1 tablet by mouth daily      Calcium Citrate-Vitamin D (CALCIUM + D PO) Take 1 tablet by mouth daily      Prenatal Vit-Fe Fumarate-FA (PRENATAL MULTIVITAMIN W/IRON) 27-0.8 MG tablet Take 1 tablet by mouth daily     No current facility-administered medications for this visit.         No Known Allergies    ROS: Please see HPI all other systems review and negative.    Physical Examination:  /61 (BP Location: Right arm, Patient Position: Sitting, Cuff Size: Adult Regular)   Pulse 67   LMP 01/16/2019   SpO2 98%   General Appearance:  The patient is well-groomed and cooperative with examination.  She is in no acute distress  Neurological Examination  Cognition: oriented x3, attention and recall intact. No aphasia or dysarthria  Cranial Nerves: 2-12 intact. Funduscopic examination is normal with sharp disc margins bilaterally.   General Motor Survey: Normal muscle bulk, tone and strength in all four ext. No tremor  Coordination: Finger to nose and heel knee shin normal bilaterally. Normal alternating movements.  Reflexes: Upper and lower extremity reflexes are within normal limits (+2) and bilaterally symmetric.   Sensory Examination:   Vibration: normal in all four ext   Pinprick:normal in all four ext except for a mild subjective distortion in about a 3 inch radius just below the ilioinguinal     Gait: Normal gait which is stable on turns. Normal arm swing. Romberg negative. Able to run    Cardiovascular Examination:  Heart is  regular in rate and rhythm to auscultation. No significant murmurs. No carotid bruits. No significant peripheral edema. Pedal pulses are palpable bilaterally.     Musculoskeletal Examination:  Neck is supple with full range of motion. No tenderness to palpation.      Investigations:  MR hip arthrogram November 9, 2018  IMPRESSION:    1. No discrete labral tear or significant degenerative arthritis involving the  right hip.  2. Trace tendinosis of the membranous portion of the distal gluteus medius  tendon.  3. Narrowing of the ischiofemoral interval and slight edema in the intervening  quadratus femoris muscle, which can be associated with symptoms of ischiofemoral  impingement.  4. 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    Result Impression   IMPRESSION:  Normal lumbar alignment. Disc spaces are maintained. 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   Result Narrative   EXAM:  DX LUMBAR SPINE 2-3 VIEWS   Other Result Information   Interface, [facility] Generic [id number] - 11/06/2018  9:24 AM CST  EXAM:  DX LUMBAR SPINE 2-3 VIEWS      IMPRESSION:  Normal lumbar alignment. Disc spaces are maintained. 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 This correlates with the radiographic findings. There is a tiny amount of periosteal new bone and surrounding mild soft tissue swelling in the medial proximal adductor.     Tiny lobulated 6 mm lesion within the lower sacrum which is hyperintense on T2-weighted images and contains a peripheral rim  of lobulated intermediate T1 signal and bright T1 signal centrally compatible with macroscopic fat. 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 Routine MR surveillance/follow-up would be recommended due to the exceedingly low malignant potential. Initial follow-up could be considered in 6 months to a year.     Mild tendinopathy and sliver of overlying fluid at the left hamstrings origin. Remainder negative.     Findings discussed and reviewed with Dr. Lam.       Electronically signed by:    LEATHA Norris MD [id number] 13-Jul-2016 14:48       Impression/Recommendations:  1. Right thigh paresthesias/spasm  Iman is presenting with pain that was bothering her with extended running.  There is no evidence on examination or on prior testing that she has a lumbosacral radiculopathy nor a femoral nerve injury.  There could be a lateral femoral cutaneous neuropathy component although the patient is describing significant spasm that would potentially make her fall from a treadmill.  Lateral femoral cutaneous neuropathy would not cause such as spasm as it is only a sensory nerve.  I question whether the ischiofemoral impingement syndrome that was potentially seen on MRI scan of the pelvis could result in impingement of the lateral femoral cutaneous nerve.  I would have to defer to Dr. Rivera on this grounds.  If it were if therapy is provided for the impingement syndrome I would assume all symptoms would improve.    If lateral femoral cutaneous neuropathy is present there would be no concern about the danger of her running.  It is interesting that previously she is also had a sacral stress fracture from running and I do question whether her technique and training approach should be evaluated further.  I believe that Dr. Rivera's office has a running program that could perhaps improve her running technique.    In the setting of a possible paresthesia or nerve injury we will look for underlying causes of nerve  injury.  She will go for laboratory testing looking for nutritional deficits, diabetes and monoclonal proteins.  I will write to her with those results through my chart.    Follow-up with me will be as needed.    Autumn Guzman MD FAAN  Department of Neurology  Pager [phone]          Answers for HPI/ROS submitted by the patient on 2/9/2019   General Symptoms: No  Skin Symptoms: No  HENT Symptoms: No  EYE SYMPTOMS: No  HEART SYMPTOMS: No  LUNG SYMPTOMS: No  INTESTINAL SYMPTOMS: No  URINARY SYMPTOMS: No  GYNECOLOGIC SYMPTOMS: Yes  BREAST SYMPTOMS: No  SKELETAL SYMPTOMS: No  BLOOD SYMPTOMS: No  NERVOUS SYSTEM SYMPTOMS: No  MENTAL HEALTH SYMPTOMS: No  Bleeding or spotting between periods: Yes  Heavy or painful periods: Yes  Irregular periods: Yes  Vaginal discharge: No  Hot flashes: No  Vaginal dryness: No  Genital ulcers: No  Reduced libido: No  Painful intercourse: No  Difficulty with sexual arousal: No  Post-menopausal bleeding: No

## 2019-02-13 LAB
ALBUMIN SERPL ELPH-MCNC: 4.1 G/DL (ref 3.7–5.1)
ALPHA1 GLOB SERPL ELPH-MCNC: 0.4 G/DL (ref 0.2–0.4)
ALPHA2 GLOB SERPL ELPH-MCNC: 0.8 G/DL (ref 0.5–0.9)
B-GLOBULIN SERPL ELPH-MCNC: 0.8 G/DL (ref 0.6–1)
GAMMA GLOB SERPL ELPH-MCNC: 0.9 G/DL (ref 0.7–1.6)
HCYS SERPL-SCNC: 13 UMOL/L (ref 4–12)
M PROTEIN SERPL ELPH-MCNC: 0 G/DL
PROT PATTERN SERPL ELPH-IMP: NORMAL

## 2019-02-14 LAB
COPPER SERPL-MCNC: 128 UG/DL (ref 80–155)
METHYLMALONATE SERPL-SCNC: 0.17 UMOL/L (ref 0–0.4)

## 2019-02-15 LAB
VIT B1 BLD-MCNC: 96 NMOL/L (ref 70–180)
VIT B6 SERPL-MCNC: 61.2 NMOL/L (ref 20–125)

## 2019-03-04 ENCOUNTER — TELEPHONE (OUTPATIENT)
Dept: OBGYN | Facility: CLINIC | Age: 39
End: 2019-03-04

## 2019-03-04 DIAGNOSIS — N97.0 INFERTILITY ASSOCIATED WITH ANOVULATION: Primary | ICD-10-CM

## 2019-03-04 DIAGNOSIS — N97.9 FEMALE INFERTILITY: ICD-10-CM

## 2019-03-04 NOTE — TELEPHONE ENCOUNTER
Last time patient saw 's she discussed patient starting Clomid if she wanted to. And now she decided she wants  to. Please call patient on her cell phone.

## 2019-03-04 NOTE — TELEPHONE ENCOUNTER
Where is she at in her cycle? When was LMP?  Per our eval 1/28/19 was ovulating from left, the open tube side. It would perhaps follow that right side (closed tube side) ovulated February, then this month would be back to left side. Is she early enough in cycle to start this month?    Aissatou Thompson Masters, DO

## 2019-03-04 NOTE — TELEPHONE ENCOUNTER
Pt requesting Rx for clomid. Wants to try clomid with timed intercourse. Pt does not want to do FS and IUI at this time. Would like to do timed intercourse for 3 times and then come back in for follow-up if not pregnant. Pt understands she has to wait for cycle before starting clomid and then would take on CD 5-9. Pt is using OPK's. No further questions. Routing to Dr. Soria OK to send?          Reviewed labs and fertility workup thus far. Labs wnl, SA borderline normal (morphology 3%, normal >3%), HSG showed occluded right tube. Discussed overall age considerations moving forward.  Discussed clomid (5-9)/IUI and success rates which may be expected. Also discussed every other cycle due to tubal occlusion. Reviewed process of IUI.   US performed today, showing follicle developing on left patent side. Reviewed US findings with pt. Will be out of town, so will do OPKs and timed intercourse.   Pt will consider tx options and contact clinic when/if ready to proceed with clomid/IUI. Also discussed she may see [facility] right now if desired. Otherwise, rec 3 cycles clomid/IUI and if not successful, will send to [facility].   Questions answered

## 2019-03-05 RX ORDER — CLOMIPHENE CITRATE 50 MG/1
50 TABLET ORAL DAILY
Qty: 5 TABLET | Refills: 0 | Status: SHIPPED | OUTPATIENT
Start: 2019-03-05 | End: 2019-04-12

## 2019-03-05 NOTE — TELEPHONE ENCOUNTER
We really need to be monitoring the side of ovulation to know when to give her the clomid. We would not want to use the clomid during the month she is ovulating from the blocked side (right).  So she'd need an US now to determine which side she just ovulated from. We cannot be sure since it's been couple months now.    Aissatou Thompson Masters, DO

## 2019-03-07 NOTE — TELEPHONE ENCOUNTER
"Called pt to schedule ordered US asap per Dr Soria as \"We need to know which side she is ovulating from before the follicle is gone\".  Pt thought she already knew what side she was ovulating from the US in January. Re-explained as below that Dr Soria states we cannot be sure since it's been a couple of mos now.  Pt verbalized understanding, in agreement with plan, and voiced no further questions.    Scheduled US tomorrow 8/8/19  "

## 2019-03-08 ENCOUNTER — ANCILLARY PROCEDURE (OUTPATIENT)
Dept: ULTRASOUND IMAGING | Facility: CLINIC | Age: 39
End: 2019-03-08
Payer: COMMERCIAL

## 2019-03-08 ENCOUNTER — OFFICE VISIT (OUTPATIENT)
Dept: OBGYN | Facility: CLINIC | Age: 39
End: 2019-03-08
Payer: COMMERCIAL

## 2019-03-08 VITALS
HEART RATE: 68 BPM | HEIGHT: 64 IN | BODY MASS INDEX: 21.34 KG/M2 | DIASTOLIC BLOOD PRESSURE: 60 MMHG | WEIGHT: 125 LBS | SYSTOLIC BLOOD PRESSURE: 104 MMHG

## 2019-03-08 DIAGNOSIS — N83.9 FALLOPIAN TUBE DISORDER: ICD-10-CM

## 2019-03-08 DIAGNOSIS — Z31.9 INFERTILITY MANAGEMENT: Primary | ICD-10-CM

## 2019-03-08 DIAGNOSIS — N97.1 TUBAL OCCLUSION: ICD-10-CM

## 2019-03-08 PROCEDURE — 99213 OFFICE O/P EST LOW 20 MIN: CPT | Performed by: OBSTETRICS & GYNECOLOGY

## 2019-03-08 PROCEDURE — 76830 TRANSVAGINAL US NON-OB: CPT | Performed by: OBSTETRICS & GYNECOLOGY

## 2019-03-08 ASSESSMENT — MIFFLIN-ST. JEOR: SCORE: 1232

## 2019-03-08 NOTE — PROGRESS NOTES
SUBJECTIVE:                                                   Iman Caal is a 38 year old female who presents to clinic today for the following health issue(s):  Patient presents with:  Ultrasound: f/u to fallopian tube disorder        HPI:  Want to start medications.  Periods have been off. Will have one day of heavy heavy bleeding and spotting afterward for last 3 mo.     Periods:  18     US:  19 Lov follicle  3/8/19 Rov follicle    This current cycle decided to take a break. No checking of strips or pressure.     Does not wan to do IUI, IVF. May change her mind in future about IUI, but for now wants to just do meds. Not opposed to FS.   Is aware overall of potential for declining fertility as age increases.    Review of PMH, SocHx, SurHx, FHx, medications completed. Epic updated.        Patient's last menstrual period was 2019 (exact date)..   Patient is sexually active, .  Using none for contraception.    reports that  has never smoked. she has never used smokeless tobacco.    STD testing offered?  Declined    Health maintenance updated:  yes    Problem list and histories reviewed & adjusted, as indicated.  Additional history: as documented.    Patient Active Problem List   Diagnosis     Sprain of neck     Tubal occlusion     Past Surgical History:   Procedure Laterality Date     melanoma removal  2017      Social History     Tobacco Use     Smoking status: Never Smoker     Smokeless tobacco: Never Used   Substance Use Topics     Alcohol use: Yes     Alcohol/week: 0.0 oz     Frequency: 2-3 times a week     Drinks per session: 1 or 2     Comment: occasional very rare      Problem (# of Occurrences) Relation (Name,Age of Onset)    Family History Negative (1) Other            Current Outpatient Medications   Medication Sig     Ascorbic Acid (VITAMIN C PO) Take 1 tablet by mouth daily      Calcium Citrate-Vitamin D (CALCIUM + D PO) Take 1 tablet by mouth daily  "     clomiPHENE (CLOMID) 50 MG tablet Take 1 tablet (50 mg) by mouth daily for 5 days Take on cycle days 5-9.     Prenatal Vit-Fe Fumarate-FA (PRENATAL MULTIVITAMIN W/IRON) 27-0.8 MG tablet Take 1 tablet by mouth daily     No current facility-administered medications for this visit.      No Known Allergies    ROS:  12 point review of systems negative other than symptoms noted below.  Constitutional: Fatigue  Genitourinary: Heavy Bleeding with Period, Irregular Menses and Painful [place]  Endocrine: Cold Intolerance    OBJECTIVE:     /60   Pulse 68   Ht 1.626 m (5' 4\")   Wt 56.7 kg (125 lb)   LMP 02/12/2019 (Exact Date)   BMI 21.46 kg/m    Body mass index is 21.46 kg/m .    Exam:  Constitutional:  Appearance: Well nourished, well developed alert, in no acute distress  Chest:  Respiratory Effort:  Breathing unlabored  Neurologic/Psychiatric:  Mental Status:  Oriented X3        ASSESSMENT/PLAN:                                                        ICD-10-CM    1. Infertility management Z31.9 US Pelvis Complete w Transvaginal Follicular Init   2. 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  Will plan clomid for unexplained infertility days 5-9. FS day 14-15. Rec OPKs. Discussed timed intercourse.   Questions answered.     Aissatou Thompson Masters, DO  [facility] [place]  "

## 2019-03-28 NOTE — PROGRESS NOTES
Iman Caal  is here for follicle study on Day # 14 of cycle.     This is cycle 1 of Clomid.  Had some mood swings when first started the clomid.  OPK neg yesterday, higher today.     She has taken Clomid 50 mg Day #5-9 of this cycle.      Hx: Ovulates/cycles regularly. TTC x 6mo.   Eval: HSG showed closed R tube. AMH 2.3, other labs wnl.   SA with morphology 3% (nl >3%)    Imaging:  Follicle study shows Rov with 23.5mm follicle and small FF in pelvis. Lov with 11mm follicle. ES 5.8mm.    Reviewed with pt US day 21 3/8/19 showed Rov follicle 10mm, quiet Lov. Previous month's cycle had Lov follicle.    Pt is really not interested in much intervention/monitoring. Specifically does not want IUI. They are pretty sure they will not do IVF. Will plan monthly clomid 50mg d5-9 with timed intercourse. No IUI, no follicle study. Did discuss follicle study w/o other interventions may not add much but can be done to monitor medication and ovulation side.   Will call on day 1 of cycle for clomid refill. If no conception in 6 cycles, pt and  will discuss if they want to do anything further. Will let me know if decides on new plan or is having any issues.      15 minutes was spent face to face with the patient today discussing her history, diagnosis, and follow-up plan as noted above. Over 50% of the visit was spent in counseling and coordination of care.    Total Visit Time: 15 minutes.     Aissatou Thompson Masters, DO

## 2019-03-29 ENCOUNTER — ANCILLARY PROCEDURE (OUTPATIENT)
Dept: ULTRASOUND IMAGING | Facility: CLINIC | Age: 39
End: 2019-03-29
Attending: OBSTETRICS & GYNECOLOGY
Payer: COMMERCIAL

## 2019-03-29 ENCOUNTER — PRENATAL OFFICE VISIT (OUTPATIENT)
Dept: OBGYN | Facility: CLINIC | Age: 39
End: 2019-03-29
Attending: OBSTETRICS & GYNECOLOGY
Payer: COMMERCIAL

## 2019-03-29 VITALS — SYSTOLIC BLOOD PRESSURE: 100 MMHG | DIASTOLIC BLOOD PRESSURE: 64 MMHG | WEIGHT: 126.2 LBS | BODY MASS INDEX: 21.66 KG/M2

## 2019-03-29 DIAGNOSIS — Z31.9 INFERTILITY MANAGEMENT: ICD-10-CM

## 2019-03-29 DIAGNOSIS — N97.1 TUBAL OCCLUSION: ICD-10-CM

## 2019-03-29 DIAGNOSIS — N97.9 FEMALE INFERTILITY, UNEXPLAINED: Primary | ICD-10-CM

## 2019-03-29 PROCEDURE — 99213 OFFICE O/P EST LOW 20 MIN: CPT | Performed by: OBSTETRICS & GYNECOLOGY

## 2019-03-29 PROCEDURE — 76830 TRANSVAGINAL US NON-OB: CPT | Performed by: OBSTETRICS & GYNECOLOGY

## 2019-04-12 ENCOUNTER — TELEPHONE (OUTPATIENT)
Dept: OBGYN | Facility: CLINIC | Age: 39
End: 2019-04-12

## 2019-04-12 DIAGNOSIS — N97.0 INFERTILITY ASSOCIATED WITH ANOVULATION: ICD-10-CM

## 2019-04-12 RX ORDER — CLOMIPHENE CITRATE 50 MG/1
50 TABLET ORAL DAILY
Qty: 5 TABLET | Refills: 0 | Status: SHIPPED | OUTPATIENT
Start: 2019-04-12 | End: 2019-05-13

## 2019-04-18 ENCOUNTER — RECORDS - HEALTHEAST (OUTPATIENT)
Dept: ADMINISTRATIVE | Facility: OTHER | Age: 39
End: 2019-04-18

## 2019-04-18 ENCOUNTER — ANCILLARY PROCEDURE (OUTPATIENT)
Dept: MAMMOGRAPHY | Facility: CLINIC | Age: 39
End: 2019-04-18
Payer: COMMERCIAL

## 2019-04-18 ENCOUNTER — OFFICE VISIT (OUTPATIENT)
Dept: OBGYN | Facility: CLINIC | Age: 39
End: 2019-04-18
Payer: COMMERCIAL

## 2019-04-18 VITALS
SYSTOLIC BLOOD PRESSURE: 100 MMHG | BODY MASS INDEX: 21.86 KG/M2 | DIASTOLIC BLOOD PRESSURE: 60 MMHG | HEART RATE: 60 BPM | WEIGHT: 131.2 LBS | HEIGHT: 65 IN

## 2019-04-18 DIAGNOSIS — Z01.419 ENCOUNTER FOR GYNECOLOGICAL EXAMINATION WITHOUT ABNORMAL FINDING: Primary | ICD-10-CM

## 2019-04-18 DIAGNOSIS — Z12.31 VISIT FOR SCREENING MAMMOGRAM: ICD-10-CM

## 2019-04-18 LAB
LAB AP CHARGES (HE HISTORICAL CONVERSION): NORMAL
PATH REPORT.COMMENTS IMP SPEC: NORMAL
PATH REPORT.COMMENTS IMP SPEC: NORMAL
PATH REPORT.FINAL DX SPEC: NORMAL
PATH REPORT.GROSS SPEC: NORMAL
PATH REPORT.MICROSCOPIC SPEC OTHER STN: NORMAL
PATH REPORT.RELEVANT HX SPEC: NORMAL
RESULT FLAG (HE HISTORICAL CONVERSION): NORMAL

## 2019-04-18 PROCEDURE — 99395 PREV VISIT EST AGE 18-39: CPT | Performed by: OBSTETRICS & GYNECOLOGY

## 2019-04-18 PROCEDURE — 77063 BREAST TOMOSYNTHESIS BI: CPT | Mod: TC

## 2019-04-18 PROCEDURE — 77067 SCR MAMMO BI INCL CAD: CPT | Mod: TC

## 2019-04-18 ASSESSMENT — ANXIETY QUESTIONNAIRES
6. BECOMING EASILY ANNOYED OR IRRITABLE: NOT AT ALL
1. FEELING NERVOUS, ANXIOUS, OR ON EDGE: NOT AT ALL
7. FEELING AFRAID AS IF SOMETHING AWFUL MIGHT HAPPEN: NOT AT ALL
GAD7 TOTAL SCORE: 0
5. BEING SO RESTLESS THAT IT IS HARD TO SIT STILL: NOT AT ALL
3. WORRYING TOO MUCH ABOUT DIFFERENT THINGS: NOT AT ALL
2. NOT BEING ABLE TO STOP OR CONTROL WORRYING: NOT AT ALL

## 2019-04-18 ASSESSMENT — PATIENT HEALTH QUESTIONNAIRE - PHQ9
SUM OF ALL RESPONSES TO PHQ QUESTIONS 1-9: 0
5. POOR APPETITE OR OVEREATING: NOT AT ALL

## 2019-04-18 ASSESSMENT — MIFFLIN-ST. JEOR: SCORE: 1268.06

## 2019-04-18 NOTE — PROGRESS NOTES
Iman is a 38 year old  female who presents for annual exam.     Besides routine health maintenance,  she would like to discuss skin exam - dermatologist found melanoma.    HPI:  The patient's PCP is  [facility].      Is month 2 on clomid. Makes her emotional but tolerating well. +ovulation, timed intercourse.    Follows with Derm q 3 mo for hx melanoma last year. Was just there last week, had 4 biopsies. Derm rec'd she get mammogram.    Runs 5x/wk. Training for marathon.  PNV      GYNECOLOGIC HISTORY:    Patient's last menstrual period was 2019.  Her current contraception method is: none.  She  reports that she has never smoked. She has never used smokeless tobacco.    Patient is sexually active.  STD testing offered?  Declined  Last PHQ-9 score on record =   PHQ-9 SCORE 2019   PHQ-9 Total Score 0     Last GAD7 score on record =   PATTIE-7 SCORE 2019   Total Score 0     Alcohol Score = 2    HEALTH MAINTENANCE:  Cholesterol: not fasting  Last Mammo: Never, Result: not applicable, Next Mammo: today   Pap:   Lab Results   Component Value Date    PAP NIL, HPV - 2018     Colonoscopy:  Never, Result: not applicable, Next Colonoscopy: age 50 years.  Dexa:  16    Health maintenance updated:  yes    HISTORY:  OB History    Para Term  AB Living   0 0 0 0 0 0   SAB TAB Ectopic Multiple Live Births   0 0 0 0 0       Patient Active Problem List   Diagnosis     Sprain of neck     Tubal occlusion     Past Surgical History:   Procedure Laterality Date     melanoma removal  2017      Social History     Tobacco Use     Smoking status: Never Smoker     Smokeless tobacco: Never Used   Substance Use Topics     Alcohol use: Yes     Alcohol/week: 0.0 oz     Frequency: 2-3 times a week     Drinks per session: 1 or 2     Comment: occasional very rare      Problem (# of Occurrences) Relation (Name,Age of Onset)    Breast Cancer (1) Paternal Grandmother (60)    Family History  "Negative (1) Maternal Uncle    Prostate Cancer (2) Maternal Uncle, Paternal Grandfather            Current Outpatient Medications   Medication Sig     Ascorbic Acid (VITAMIN C PO) Take 1 tablet by mouth daily      Calcium Citrate-Vitamin D (CALCIUM + D PO) Take 1 tablet by mouth daily      clomiPHENE (CLOMID) 50 MG tablet Take 1 tablet (50 mg) by mouth daily Take on cycle days 5-9.     Prenatal Vit-Fe Fumarate-FA (PRENATAL MULTIVITAMIN W/IRON) 27-0.8 MG tablet Take 1 tablet by mouth daily     No current facility-administered medications for this visit.      No Known Allergies    Past medical, surgical, social and family histories were reviewed and updated in EPIC.    ROS:   12 point review of systems negative other than symptoms noted below.    EXAM:  /60   Pulse 60   Ht 1.638 m (5' 4.5\")   Wt 59.5 kg (131 lb 3.2 oz)   LMP 04/12/2019   BMI 22.17 kg/m     BMI: Body mass index is 22.17 kg/m .    PHYSICAL EXAM:  Constitutional:  Appearance: Well nourished, well developed, alert, in no acute distress  Neck:  Lymph Nodes:  No lymphadenopathy present    Thyroid:  Gland size normal, nontender, no nodules or masses present  on palpation  Chest:  Respiratory Effort:  Breathing unlabored  Cardiovascular:    Heart: Auscultation:  Regular rate, normal rhythm, no murmurs present  Breasts: Inspection of Breasts:  No lymphadenopathy present., Palpation of Breasts and Axillae:  No masses present on palpation, no breast tenderness., Axillary Lymph Nodes:  No lymphadenopathy present. and No nodularity, asymmetry or nipple discharge bilaterally.  Gastrointestinal:   Abdominal Examination:  Abdomen nontender to palpation, tone normal without rigidity or guarding, no masses present, umbilicus without lesions   Liver and Spleen:  No hepatomegaly present, liver nontender to palpation    Hernias:  No hernias present  Lymphatic: Lymph Nodes:  No other lymphadenopathy present  Skin:  General Inspection:  No rashes present, no " lesions present, no areas of  discoloration    Genitalia and Groin:  No rashes present, no lesions present, no areas of  discoloration, no masses present  Neurologic/Psychiatric:    Mental Status:  Oriented X3     Pelvic Exam:  External Genitalia:     Normal appearance for age, no discharge present, no tenderness present, no inflammatory lesions present, color normal  Vagina:     Normal vaginal vault without central or paravaginal defects, no discharge present, no inflammatory lesions present, no masses present  Bladder:     Nontender to palpation  Urethra:   Urethral Body:  Urethra palpation normal, urethra structural support normal   Urethral Meatus:  No erythema or lesions present  Cervix:     Appearance healthy, no lesions present, nontender to palpation, no bleeding present  Uterus:     Uterus: firm, normal sized and nontender, anteverted in position.   Adnexa:     No adnexal tenderness present, no adnexal masses present  Perineum:     Perineum within normal limits, no evidence of trauma, no rashes or skin lesions present  Anus:     Anus within normal limits, no hemorrhoids present  Inguinal Lymph Nodes:     No lymphadenopathy present  Pubic Hair:     Normal pubic hair distribution for age  Genitalia and Groin:     No rashes present, no lesions present, no areas of discoloration, no masses present      COUNSELING:   Reviewed preventive health counseling, as reflected in patient instructions       Family planning       Folic Acid Counseling    BMI: Body mass index is 22.17 kg/m .      ASSESSMENT:  38 year old female with satisfactory annual exam.    ICD-10-CM    1. Encounter for gynecological examination without abnormal finding Z01.419        PLAN:  -UTD for cervical cancer screening. Reviewed guidelines-pap q 3yrs until age 30 when co-testing q 5 years.  -Breast self awareness discussed. Getting mammogram today for hx melanoma.  -Colonoscopy age 45  --Reviewed her fertility plans. Discussed how distance  running can negatively impact fertility. Consider stopping meds for now.   -Return one year for next annual exam      Aissatou Thompson Masters, DO

## 2019-04-19 ASSESSMENT — ANXIETY QUESTIONNAIRES: GAD7 TOTAL SCORE: 0

## 2019-05-13 ENCOUNTER — TELEPHONE (OUTPATIENT)
Dept: OBGYN | Facility: CLINIC | Age: 39
End: 2019-05-13

## 2019-05-13 DIAGNOSIS — N97.0 INFERTILITY ASSOCIATED WITH ANOVULATION: ICD-10-CM

## 2019-05-13 RX ORDER — CLOMIPHENE CITRATE 50 MG/1
50 TABLET ORAL DAILY
Qty: 5 TABLET | Refills: 0 | Status: SHIPPED | OUTPATIENT
Start: 2019-05-13 | End: 2019-06-09

## 2019-05-13 NOTE — TELEPHONE ENCOUNTER
Patient needs refill on Clomid, did not conceive this month.  [address] in [facility]

## 2019-05-13 NOTE — TELEPHONE ENCOUNTER
OV notes 3/29/19  Pt is really not interested in much intervention/monitoring. Specifically does not want IUI. They are pretty sure they will not do IVF. Will plan monthly clomid 50mg d5-9 with timed intercourse. No IUI, no follicle study. Did discuss follicle study w/o other interventions may not add much but can be done to monitor medication and ovulation side.   Will call on day 1 of cycle for clomid refill. If no conception in 6 cycles, pt and  will discuss if they want to do anything further. Will let me know if decides on new plan or is having any issues.     Pt requesting clomid refill.   Medication refilled  Donna Guzmán, RN on 5/13/2019 at 12:18 PM

## 2019-06-09 DIAGNOSIS — N97.0 INFERTILITY ASSOCIATED WITH ANOVULATION: ICD-10-CM

## 2019-06-10 RX ORDER — CLOMIPHENE CITRATE 50 MG/1
TABLET ORAL
Qty: 5 TABLET | Refills: 0 | Status: SHIPPED | OUTPATIENT
Start: 2019-06-10 | End: 2019-07-10

## 2019-06-10 NOTE — TELEPHONE ENCOUNTER
Requested Prescriptions   Pending Prescriptions Disp Refills     clomiPHENE (CLOMID) 50 MG tablet [Pharmacy Med Name: CLOMIPHENE* CITRATE 50MG TABLETS] 5 tablet 0     Sig: TAKE 1 TABLET BY MOUTH DAILY ON CYCLE DAYS 5-9       There is no refill protocol information for this order        Refill approved  Donna Guzmán RN on 6/10/2019 at 8:04 AM

## 2019-07-10 DIAGNOSIS — N97.0 INFERTILITY ASSOCIATED WITH ANOVULATION: ICD-10-CM

## 2019-07-10 RX ORDER — CLOMIPHENE CITRATE 50 MG/1
TABLET ORAL
Qty: 5 TABLET | Refills: 0 | Status: SHIPPED | OUTPATIENT
Start: 2019-07-10 | End: 2019-08-07

## 2019-07-10 NOTE — TELEPHONE ENCOUNTER
OV notes 3/29/19  Pt is really not interested in much intervention/monitoring. Specifically does not want IUI. They are pretty sure they will not do IVF. Will plan monthly clomid 50mg d5-9 with timed intercourse. No IUI, no follicle study. Did discuss follicle study w/o other interventions may not add much but can be done to monitor medication and ovulation side.   Will call on day 1 of cycle for clomid refill. If no conception in 6 cycles, pt and  will discuss if they want to do anything further. Will let me know if decides on new plan or is having any issues.   Dr Soria    Pt requesting refill on Clomid  This will be month 5.    Prescription approved per [facility] Refill Protocol.

## 2019-07-10 NOTE — TELEPHONE ENCOUNTER
Requested Prescriptions   Pending Prescriptions Disp Refills     clomiPHENE (CLOMID) 50 MG tablet [Pharmacy Med Name: CLOMIPHENE* CITRATE 50MG TABLETS] 5 tablet 0     Sig: TAKE 1 TABLET BY MOUTH DAILY ON CYCLE DAYS 5-9       There is no refill protocol information for this order        Last Written Prescription Date:  6/10/19  Last Fill Quantity: 5,  # refills: 0   Last office visit: 4/18/2019 with prescribing provider:  Aissatou Soria   Future Office Visit:  none

## 2019-08-07 DIAGNOSIS — N97.0 INFERTILITY ASSOCIATED WITH ANOVULATION: ICD-10-CM

## 2019-08-07 RX ORDER — CLOMIPHENE CITRATE 50 MG/1
TABLET ORAL
Qty: 5 TABLET | Refills: 0 | Status: SHIPPED | OUTPATIENT
Start: 2019-08-07 | End: 2024-07-24

## 2019-08-07 NOTE — TELEPHONE ENCOUNTER
Requested Prescriptions   Pending Prescriptions Disp Refills     clomiPHENE (CLOMID) 50 MG tablet [Pharmacy Med Name: CLOMIPHENE* CITRATE 50MG TABLETS] 5 tablet 0     Sig: TAKE 1 TABLET BY MOUTH DAILY ON CYCLE DAYS 5-9       There is no refill protocol information for this order      Last Written Prescription Date:  7/10/19  Last Fill Quantity: 5,  # refills: 0   Last office visit: 4/18/2019 with prescribing provider:  Estella   Future Office Visit:  none    OV notes 3/29/19  Pt is really not interested in much intervention/monitoring. Specifically does not want IUI. They are pretty sure they will not do IVF. Will plan monthly clomid 50mg d5-9 with timed intercourse. No IUI, no follicle study. Did discuss follicle study w/o other interventions may not add much but can be done to monitor medication and ovulation side.   Will call on day 1 of cycle for clomid refill. If no conception in 6 cycles, pt and  will discuss if they want to do anything further. Will let me know if decides on new plan or is having any issues.   Dr Soria     Pt requesting refill on Clomid  This will be month 6     Prescription approved per [facility] Refill Protocol.

## 2019-09-09 ENCOUNTER — TRANSFERRED RECORDS (OUTPATIENT)
Dept: HEALTH INFORMATION MANAGEMENT | Facility: CLINIC | Age: 39
End: 2019-09-09

## 2020-03-01 ENCOUNTER — HEALTH MAINTENANCE LETTER (OUTPATIENT)
Age: 40
End: 2020-03-01

## 2020-06-17 ENCOUNTER — ANCILLARY PROCEDURE (OUTPATIENT)
Dept: MAMMOGRAPHY | Facility: CLINIC | Age: 40
End: 2020-06-17
Attending: OBSTETRICS & GYNECOLOGY
Payer: COMMERCIAL

## 2020-06-17 DIAGNOSIS — Z12.31 VISIT FOR SCREENING MAMMOGRAM: ICD-10-CM

## 2020-06-17 PROCEDURE — 77067 SCR MAMMO BI INCL CAD: CPT | Mod: TC

## 2020-06-17 PROCEDURE — 77063 BREAST TOMOSYNTHESIS BI: CPT | Mod: TC

## 2020-12-14 ENCOUNTER — HEALTH MAINTENANCE LETTER (OUTPATIENT)
Age: 40
End: 2020-12-14

## 2021-06-25 DIAGNOSIS — Z12.31 VISIT FOR SCREENING MAMMOGRAM: ICD-10-CM

## 2021-06-25 PROCEDURE — 77067 SCR MAMMO BI INCL CAD: CPT | Mod: TC | Performed by: RADIOLOGY

## 2021-06-25 PROCEDURE — 77063 BREAST TOMOSYNTHESIS BI: CPT | Mod: TC | Performed by: RADIOLOGY

## 2021-10-02 ENCOUNTER — HEALTH MAINTENANCE LETTER (OUTPATIENT)
Age: 41
End: 2021-10-02

## 2022-01-22 ENCOUNTER — HEALTH MAINTENANCE LETTER (OUTPATIENT)
Age: 42
End: 2022-01-22

## 2022-06-28 ENCOUNTER — ANCILLARY PROCEDURE (OUTPATIENT)
Dept: MAMMOGRAPHY | Facility: CLINIC | Age: 42
End: 2022-06-28
Payer: COMMERCIAL

## 2022-06-28 DIAGNOSIS — Z12.31 VISIT FOR SCREENING MAMMOGRAM: ICD-10-CM

## 2022-06-28 PROCEDURE — 77063 BREAST TOMOSYNTHESIS BI: CPT | Mod: TC | Performed by: RADIOLOGY

## 2022-06-28 PROCEDURE — 77067 SCR MAMMO BI INCL CAD: CPT | Mod: TC | Performed by: RADIOLOGY

## 2023-01-14 ENCOUNTER — HEALTH MAINTENANCE LETTER (OUTPATIENT)
Age: 43
End: 2023-01-14

## 2023-04-23 ENCOUNTER — HEALTH MAINTENANCE LETTER (OUTPATIENT)
Age: 43
End: 2023-04-23

## 2023-07-05 ENCOUNTER — ANCILLARY PROCEDURE (OUTPATIENT)
Dept: MAMMOGRAPHY | Facility: CLINIC | Age: 43
End: 2023-07-05
Payer: COMMERCIAL

## 2023-07-05 DIAGNOSIS — Z12.31 VISIT FOR SCREENING MAMMOGRAM: ICD-10-CM

## 2023-07-05 PROCEDURE — 77067 SCR MAMMO BI INCL CAD: CPT | Mod: TC | Performed by: RADIOLOGY

## 2023-07-05 PROCEDURE — 77063 BREAST TOMOSYNTHESIS BI: CPT | Mod: TC | Performed by: RADIOLOGY

## 2024-04-27 ENCOUNTER — HEALTH MAINTENANCE LETTER (OUTPATIENT)
Age: 44
End: 2024-04-27

## 2024-07-22 NOTE — PROGRESS NOTES
Iman is a 44 year old  female who presents for annual exam.     Besides routine health maintenance, she would like to discuss perimenopausal symptoms, night sweats, fatigue and brain fog.    HPI:  The patient's PCP is [facility].  She is here for annual exam, due for pap and mammo today.   Periods Q 28-30 days, lasing 3 days. Getting shorter but heavier.     Works as , vegetarian.     Has concern of perimenopause. For the past 6-9 months has had weight gain, fatigue, night sweats, brain god, mood changes. States her other went through menopause age 40-50.     Feels her anxiety is stable, taking sertraline every day.      GYNECOLOGIC HISTORY:    Patient's last menstrual period was 2024.    Regular menses? yes  Menses every 28-30 days.  Length of menses: 3 days    Her current contraception method is: natural family planning.  She  reports that she has never smoked. She has never used smokeless tobacco.    Patient is sexually active.  STD testing offered?  Declined    Last PHQ-9 score on record =       2024    10:44 AM   PHQ-9 SCORE   PHQ-9 Total Score 6     Last GAD7 score on record =       2024    10:44 AM   PATTIE-7 SCORE   Total Score 1     Alcohol Score: 2    HEALTH MAINTENANCE:    Overdue       Never done ADVANCE CARE PLANNING (Every 5 Years)     Never done GLUCOSE (Every 3 Years)     Never done HIV SCREENING (Once)     Never done HEPATITIS C SCREENING (Once)     2020 YEARLY PREVENTIVE VISIT (Yearly)  Last completed: 2019     Never done LIPID (Every 5 Years)     2023 HPV TEST (Once)  Last completed:  PAP (Every 5 Years)  Last completed: 2018     SEP 1  2023 COVID-19 Vaccine ( season)  Last completed:  PHQ-2 (once per calendar year) (Yearly, January to December)  Last completed: 2019         Upcoming       SEP 1  2024 INFLUENZA VACCINE (1)  Last  "completed: Sep 21, 2023     JUL 5  2025 MAMMO SCREENING (Every 2 Years)   Scheduled for:  DTAP/TDAP/TD IMMUNIZATION (3 - Td or Tdap)  Last completed: 2021       Health maintenance updated:  yes    HISTORY:  OB History    Para Term  AB Living   0 0 0 0 0 0   SAB IAB Ectopic Multiple Live Births   0 0 0 0 0       Patient Active Problem List   Diagnosis    Sprain of neck    Tubal occlusion     Past Surgical History:   Procedure Laterality Date    melanoma removal  2017      Social History     Tobacco Use    Smoking status: Never    Smokeless tobacco: Never   Substance Use Topics    Alcohol use: Yes     Alcohol/week: 0.0 standard drinks of alcohol     Comment: occasional very rare      Problem (# of Occurrences) Relation (Name,Age of Onset)    Family History Negative (1) Maternal Uncle    Breast Cancer (1) Paternal Grandmother (60)    Prostate Cancer (2) Maternal Uncle, Paternal Grandfather              Current Outpatient Medications   Medication Sig Dispense Refill    sertraline (ZOLOFT) 50 MG tablet Take 50 mg by mouth daily       No current facility-administered medications for this visit.     Allergies   Allergen Reactions    Dog Epithelium (Canis Lupus Familiaris) Hives     Nasal congestion    Grass Hives     Nasal congestion       Past medical, surgical, social and family histories were reviewed and updated in EPIC.      EXAM:  /68   Ht 1.632 m (5' 4.25\")   Wt 64.9 kg (143 lb)   LMP 2024   BMI 24.36 kg/m     BMI: Body mass index is 24.36 kg/m .    PHYSICAL EXAM:  Constitutional:   Appearance: Well nourished, well developed, alert, in no acute distress  Neck:  Lymph Nodes:  No lymphadenopathy present    Thyroid:  Gland size normal, nontender, no nodules or masses present  on palpation  Chest:  Respiratory Effort:  Breathing unlabored  Cardiovascular:    Heart: Auscultation:  Regular rate, normal rhythm, no murmurs " present  Breasts: Deferred.  Gastrointestinal:   Abdominal Examination:  Abdomen nontender to palpation, tone normal without rigidity or guarding, no masses present, umbilicus without lesions   Liver and Spleen:  No hepatomegaly present, liver nontender to palpation    Hernias:  No hernias present  Lymphatic: Lymph Nodes:  No other lymphadenopathy present  Skin:  General Inspection:  No rashes present, no lesions present, no areas of  discoloration  Neurologic:    Mental Status:  Oriented X3.  Normal strength and tone, sensory exam                grossly normal, mentation intact and speech normal.    Psychiatric:   Mentation appears normal and affect normal/bright.         Pelvic Exam:  External Genitalia:     Normal appearance for age, no discharge present, no tenderness present, no inflammatory lesions present, color normal  Vagina:     Normal vaginal vault without central or paravaginal defects, no discharge present, no inflammatory lesions present, no masses present. Narrow pelvic opening, tender pubic symphysis.   Bladder:     Nontender to palpation  Urethra:   Urethral Body:  Urethra palpation normal, urethra structural support normal   Urethral Meatus:  No erythema or lesions present  Cervix:     Appearance healthy, no lesions present, nontender to palpation, no bleeding present  Uterus:     Uterus: firm, normal sized and nontender, anteverted in position.   Adnexa:     No adnexal tenderness present, no adnexal masses present  Perineum:     Perineum within normal limits, no evidence of trauma, no rashes or skin lesions present  Anus:     Anus within normal limits, no hemorrhoids present  Inguinal Lymph Nodes:     No lymphadenopathy present  Pubic Hair:     Normal pubic hair distribution for age  Genitalia and Groin:     No rashes present, no lesions present, no areas of discoloration, no masses present    COUNSELING:   Reviewed preventive health counseling, as reflected in patient instructions       Regular  exercise       Healthy diet/nutrition       Contraception       Family planning       Safe sex practices/STD prevention       Colorectal Cancer Screening       Consider Hep C screening for all patients one time for ages 18-79 years       Syphilis screening for high risk patients        HIV screeninx in teen years, 1x in adult years, and at intervals if high risk       (Cathi)menopause management    BMI: Body mass index is 24.36 kg/m .      ASSESSMENT:  44 year old female with satisfactory annual exam.    ICD-10-CM    1. Encounter for gynecological examination without abnormal finding  Z01.419 Gynecologic Cytology (Pap) and HPV - Recommended Age 30-65 Years      2. Screening for thyroid disorder  Z13.29 TSH with free T4 reflex      3. Screening for diabetes mellitus  Z13.1 Hemoglobin A1c      4. Routine screening for STI (sexually transmitted infection)  Z11.3 HIV Antigen Antibody Combo     Hepatitis C Screen Reflex to HCV RNA Quant and Genotype      5. Lipid screening  Z13.220 Lipid panel reflex to direct LDL Non-fasting          PLAN:  Reviewed the WHI and the more recent extension data after 20 yrs since the WHI on the pros/cons, as well as short and long term safety of HRT.  Reviewed the studied population in the WHI and the indication for start of HRT and the years out from menopause in that group vs how HRT would typically be used.  Reviewed recommendations for lowest dose and shortest amount of time possible for HRT but also within the parameters of QOL.  Reviewed QOL and impacts of v.m sx, and other perimenopausal/menopausal sx, vs risk of breast cancer of 0.48% increase over baseline, per year, in women on HRT vs 0.4% on ERT alone.  Discussed risks for hypercoagulability and increased risk of stroke, VTE, and CVD.   Addressed non-hormonal options that are also available for v.m sx and sleep especially, though the brain fog, fatigue and metabolism are less well treated, or completely not effective, in  those situations.    -Pap sent today, we will follow up with result and recommend follow up plan at that time  -Sent in Lipids, TSH with reflex T4 today, A1c, HIV, hep c  -discussed perimenopause, did not start hormones. Discussed black cohosh. Reviewed that supplements are not regulated and have mixed patient outcomes.  -Eat a balance and variety of foods and beverages. Include plenty of vegetables, fruits, whole grains, and proteins. Limit foods and beverages with high sodium, trans fats, added sugars, and alcohol.  -Engage in at least 2.5 hours of medium-intensity (walking) or 1.25 hours of high-intensity (running) aerobic physical activity per week in addition to engaging in strengthening activities at least 2 times per week.  -STI testing offered   -If you are not preventing pregnancy, take a folic acid supplement 0.4 mg/day.  -If desired, take a Vitamin D supplement 400 to 800 IU/day.   -Follow sexually transmitted infection (STI) prevention tips: talk to partners about STI testing, use condoms or other barriers, and get tested for STIs with new partners.  -Return for a preventative visit yearly.  -Call with any questions or concerns.      Eleni Blackwell PA-C

## 2024-07-24 ENCOUNTER — OFFICE VISIT (OUTPATIENT)
Dept: OBGYN | Facility: CLINIC | Age: 44
End: 2024-07-24
Payer: COMMERCIAL

## 2024-07-24 ENCOUNTER — ANCILLARY PROCEDURE (OUTPATIENT)
Dept: MAMMOGRAPHY | Facility: CLINIC | Age: 44
End: 2024-07-24
Payer: COMMERCIAL

## 2024-07-24 VITALS
DIASTOLIC BLOOD PRESSURE: 68 MMHG | SYSTOLIC BLOOD PRESSURE: 104 MMHG | BODY MASS INDEX: 24.41 KG/M2 | WEIGHT: 143 LBS | HEIGHT: 64 IN

## 2024-07-24 DIAGNOSIS — Z11.3 ROUTINE SCREENING FOR STI (SEXUALLY TRANSMITTED INFECTION): ICD-10-CM

## 2024-07-24 DIAGNOSIS — Z12.31 VISIT FOR SCREENING MAMMOGRAM: ICD-10-CM

## 2024-07-24 DIAGNOSIS — Z13.1 SCREENING FOR DIABETES MELLITUS: ICD-10-CM

## 2024-07-24 DIAGNOSIS — Z13.29 SCREENING FOR THYROID DISORDER: ICD-10-CM

## 2024-07-24 DIAGNOSIS — Z13.220 LIPID SCREENING: ICD-10-CM

## 2024-07-24 DIAGNOSIS — Z01.419 ENCOUNTER FOR GYNECOLOGICAL EXAMINATION WITHOUT ABNORMAL FINDING: Primary | ICD-10-CM

## 2024-07-24 LAB — HBA1C MFR BLD: 4.8 % (ref 0–5.6)

## 2024-07-24 PROCEDURE — 77067 SCR MAMMO BI INCL CAD: CPT | Mod: TC | Performed by: RADIOLOGY

## 2024-07-24 PROCEDURE — 84443 ASSAY THYROID STIM HORMONE: CPT

## 2024-07-24 PROCEDURE — G0145 SCR C/V CYTO,THINLAYER,RESCR: HCPCS

## 2024-07-24 PROCEDURE — 83036 HEMOGLOBIN GLYCOSYLATED A1C: CPT

## 2024-07-24 PROCEDURE — 99386 PREV VISIT NEW AGE 40-64: CPT

## 2024-07-24 PROCEDURE — 80061 LIPID PANEL: CPT

## 2024-07-24 PROCEDURE — 87624 HPV HI-RISK TYP POOLED RSLT: CPT

## 2024-07-24 PROCEDURE — 86803 HEPATITIS C AB TEST: CPT

## 2024-07-24 PROCEDURE — 87389 HIV-1 AG W/HIV-1&-2 AB AG IA: CPT

## 2024-07-24 PROCEDURE — 36415 COLL VENOUS BLD VENIPUNCTURE: CPT

## 2024-07-24 PROCEDURE — 77063 BREAST TOMOSYNTHESIS BI: CPT | Mod: TC | Performed by: RADIOLOGY

## 2024-07-24 ASSESSMENT — ANXIETY QUESTIONNAIRES
GAD7 TOTAL SCORE: 1
3. WORRYING TOO MUCH ABOUT DIFFERENT THINGS: NOT AT ALL
2. NOT BEING ABLE TO STOP OR CONTROL WORRYING: NOT AT ALL
5. BEING SO RESTLESS THAT IT IS HARD TO SIT STILL: NOT AT ALL
1. FEELING NERVOUS, ANXIOUS, OR ON EDGE: NOT AT ALL
6. BECOMING EASILY ANNOYED OR IRRITABLE: SEVERAL DAYS
GAD7 TOTAL SCORE: 1
IF YOU CHECKED OFF ANY PROBLEMS ON THIS QUESTIONNAIRE, HOW DIFFICULT HAVE THESE PROBLEMS MADE IT FOR YOU TO DO YOUR WORK, TAKE CARE OF THINGS AT HOME, OR GET ALONG WITH OTHER PEOPLE: NOT DIFFICULT AT ALL
7. FEELING AFRAID AS IF SOMETHING AWFUL MIGHT HAPPEN: NOT AT ALL

## 2024-07-24 ASSESSMENT — PATIENT HEALTH QUESTIONNAIRE - PHQ9
5. POOR APPETITE OR OVEREATING: NOT AT ALL
SUM OF ALL RESPONSES TO PHQ QUESTIONS 1-9: 6

## 2024-07-25 LAB
CHOLEST SERPL-MCNC: 173 MG/DL
FASTING STATUS PATIENT QL REPORTED: NO
HCV AB SERPL QL IA: NONREACTIVE
HDLC SERPL-MCNC: 55 MG/DL
HIV 1+2 AB+HIV1 P24 AG SERPL QL IA: NONREACTIVE
HPV HR 12 DNA CVX QL NAA+PROBE: NEGATIVE
HPV16 DNA CVX QL NAA+PROBE: NEGATIVE
HPV18 DNA CVX QL NAA+PROBE: NEGATIVE
HUMAN PAPILLOMA VIRUS FINAL DIAGNOSIS: NORMAL
LDLC SERPL CALC-MCNC: 107 MG/DL
NONHDLC SERPL-MCNC: 118 MG/DL
TRIGL SERPL-MCNC: 55 MG/DL
TSH SERPL DL<=0.005 MIU/L-ACNC: 1.67 UIU/ML (ref 0.3–4.2)

## 2024-07-29 LAB
BKR LAB AP GYN ADEQUACY: NORMAL
BKR LAB AP GYN INTERPRETATION: NORMAL
BKR LAB AP PREVIOUS ABNORMAL: NORMAL
PATH REPORT.COMMENTS IMP SPEC: NORMAL
PATH REPORT.COMMENTS IMP SPEC: NORMAL
PATH REPORT.RELEVANT HX SPEC: NORMAL

## 2024-09-25 ENCOUNTER — MEDICAL CORRESPONDENCE (OUTPATIENT)
Dept: HEALTH INFORMATION MANAGEMENT | Facility: CLINIC | Age: 44
End: 2024-09-25
Payer: COMMERCIAL

## 2024-09-25 ENCOUNTER — ORDERS ONLY (AUTO-RELEASED) (OUTPATIENT)
Dept: CARDIOLOGY | Facility: CLINIC | Age: 44
End: 2024-09-25
Payer: COMMERCIAL

## 2024-09-25 DIAGNOSIS — R00.2 PALPITATIONS: ICD-10-CM

## 2024-09-25 DIAGNOSIS — R55 SYNCOPE: ICD-10-CM

## 2024-09-25 DIAGNOSIS — R55 SYNCOPE: Primary | ICD-10-CM

## 2025-07-21 ENCOUNTER — TRANSFERRED RECORDS (OUTPATIENT)
Dept: GASTROENTEROLOGY | Facility: OTHER | Age: 45
End: 2025-07-21

## 2025-07-23 PROBLEM — D12.6 TUBULAR ADENOMA OF COLON: Status: ACTIVE | Noted: 2025-07-23

## 2025-07-23 NOTE — PROCEDURES
[facility]   [address] [facility], [address], [place] [address]     Patient Name: Iman Caal  Gender:  Female  Exam Date: 07/21/2025 Visit Number:  [id number]  Age: 45 Years YOB: 1980  Attending MD: Richard Hayes MD Medical Record#:  [id number]    Procedure: Colonoscopy   Indications: Family history   of polyps  in patient's Mother and brother.   Referring MD: Referral Self  Primary MD:      MD No Primary  Medications: Admitting Medications:   0.9% Normal Saline at TKO   Intra Procedure Medications:   Patient received monitored anesthesia care.     Complications: No immediate complications  ______________________________________________________________________________  Procedure:   An examination of the heart and lungs was performed and found to be within acceptable limits.  .  The patient was therefore deemed a reasonable candidate for endoscopy and sedation.   The risks and benefits of the procedure were explained to the patient.After obtaining informed consent, the patient received monitored anesthesia care and I passed the scope   without difficulty via the rectum to the cecum.  The appendiceal orifice and ic valve were identified.  The scope was retroflexed during the examination  The quality of the prep was good  (Miralax/Gatorade/2 tablets Bisacodyl/Magnesium Citrate).    This was a complete examination throughout the entire colon.    Findings:    Polyp location: ascending colon.  Quantity: 1.  Size: 10 mm.  Polyp shape:  sessile.         Maneuver: polypectomy was performed with a cold snare and hemoclip.       Removal:  complete.  Retrieval: complete.  Bleeding: none.  Remainder of the exam is normal.    Impression:  Sessile colonic polyp    Preliminary Plan:  The patient and their physician will receive a copy of the pathology report as well as pathology-based recommendations for future screening or surveillance.  Pathology Results:  A: COLON,  ASCENDING, POLYP:           1. Sessile serrated adenoma           2. Negative for overt dysplasia           3. Per the colonoscopy report:               a. Polyp size: 10 mm               b. Resection: Complete               c. Retrieval: Complete      MICROSCOPIC  A: Performed     Electronically signed by: Danny Bullock MD    Interpreted at [facility], [address] [place] [id number]    Orders    Instruction(s)/Education:  Instruction/Education Timeframe Assessment   Colon Cancer Prevention  K63.5   Colon Polyps  K63.5       Final Plan:  Repeat colonoscopy in 3 years.    We will attempt to contact you at appropriate intervals via [place] mail.  We may not be able to find you or contact you at that time, therefore you should know that the responsibility for following our recommendation rests with you.  If you don't hear from us at the time your procedure is due, please contact our office to schedule an appointment.  If your contact information should change, please contact our office so that we can update your record.      _Electronically signed by:___________________  Richard Hayes MD                 07/21/2025    cc: MD No Primary

## 2025-07-24 ENCOUNTER — PATIENT OUTREACH (OUTPATIENT)
Dept: GASTROENTEROLOGY | Facility: CLINIC | Age: 45
End: 2025-07-24
Payer: COMMERCIAL

## 2025-07-29 ENCOUNTER — HOSPITAL ENCOUNTER (OUTPATIENT)
Dept: MAMMOGRAPHY | Facility: CLINIC | Age: 45
Discharge: HOME | End: 2025-07-29
Payer: COMMERCIAL

## 2025-07-29 DIAGNOSIS — Z12.31 VISIT FOR SCREENING MAMMOGRAM: ICD-10-CM

## 2025-07-29 PROCEDURE — 77067 SCR MAMMO BI INCL CAD: CPT

## 2025-08-24 ENCOUNTER — HEALTH MAINTENANCE LETTER (OUTPATIENT)
Age: 45
End: 2025-08-24